# Patient Record
Sex: MALE | Race: WHITE | URBAN - METROPOLITAN AREA
[De-identification: names, ages, dates, MRNs, and addresses within clinical notes are randomized per-mention and may not be internally consistent; named-entity substitution may affect disease eponyms.]

---

## 2023-09-12 PROBLEM — M65.9 TENOSYNOVITIS OF FOOT: Status: ACTIVE | Noted: 2023-09-12

## 2023-09-12 PROBLEM — M24.552 LEFT HIP FLEXOR TIGHTNESS: Status: ACTIVE | Noted: 2023-09-12

## 2023-09-12 PROBLEM — M65.972 TENOSYNOVITIS OF LEFT ANKLE: Status: ACTIVE | Noted: 2023-09-12

## 2023-09-12 PROBLEM — M65.9 TENOSYNOVITIS OF LEFT ANKLE: Status: ACTIVE | Noted: 2023-09-12

## 2023-09-12 PROBLEM — M79.89 SWELLING OF LEFT FOOT: Status: ACTIVE | Noted: 2023-09-12

## 2023-09-12 PROBLEM — M76.71 PERONEAL TENDINITIS OF RIGHT LOWER EXTREMITY: Status: ACTIVE | Noted: 2023-09-12

## 2023-09-12 PROBLEM — Q66.6 VALGUS DEFORMITY OF BOTH FEET: Status: ACTIVE | Noted: 2023-09-12

## 2023-09-12 PROBLEM — M65.979 TENOSYNOVITIS OF FOOT: Status: ACTIVE | Noted: 2023-09-12

## 2023-09-12 PROBLEM — M62.9 HAMSTRING TIGHTNESS OF BOTH LOWER EXTREMITIES: Status: ACTIVE | Noted: 2023-09-12

## 2023-09-12 PROBLEM — M62.89 QUADRICEP TIGHTNESS: Status: ACTIVE | Noted: 2023-09-12

## 2023-09-12 PROBLEM — H52.31 ANISOMETROPIA: Status: ACTIVE | Noted: 2023-09-12

## 2023-09-12 PROBLEM — S83.8X2A INJURY OF MENISCUS OF LEFT KNEE: Status: ACTIVE | Noted: 2023-09-12
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
## 2023-09-12 PROBLEM — M17.12 OSTEOARTHRITIS OF LEFT KNEE: Status: ACTIVE | Noted: 2023-09-12

## 2023-09-12 PROBLEM — S96.911A STRAIN OF RIGHT FOOT: Status: ACTIVE | Noted: 2023-09-12

## 2023-09-12 PROBLEM — M21.072 EVERSION DEFORMITY OF LEFT FOOT: Status: ACTIVE | Noted: 2023-09-12

## 2023-09-12 PROBLEM — M76.52 PATELLAR TENDONITIS OF LEFT KNEE: Status: ACTIVE | Noted: 2023-09-12

## 2023-09-12 PROBLEM — M22.2X2 PATELLOFEMORAL PAIN SYNDROME OF LEFT KNEE: Status: ACTIVE | Noted: 2023-09-12

## 2023-09-12 PROBLEM — H44.20 DEGENERATIVE PROGRESSIVE HIGH MYOPIA: Status: ACTIVE | Noted: 2023-09-12

## 2023-09-12 PROBLEM — M21.70 LEG LENGTH DISCREPANCY: Status: ACTIVE | Noted: 2023-09-12

## 2023-09-12 PROBLEM — M25.372 LEFT ANKLE INSTABILITY: Status: ACTIVE | Noted: 2023-09-12

## 2023-09-12 PROBLEM — S93.492A HIGH ANKLE SPRAIN OF LEFT LOWER EXTREMITY: Status: ACTIVE | Noted: 2023-09-12

## 2023-09-12 PROBLEM — H25.10 NUCLEAR SENILE CATARACT: Status: ACTIVE | Noted: 2023-09-12

## 2023-09-12 PROBLEM — S93.601A SPRAIN OF RIGHT FOOT: Status: ACTIVE | Noted: 2023-09-12

## 2023-09-12 PROBLEM — M25.80 SESAMOIDITIS: Status: ACTIVE | Noted: 2023-09-12

## 2023-09-12 PROBLEM — H43.10 VITREOUS HEMORRHAGE (MULTI): Status: ACTIVE | Noted: 2023-09-12

## 2023-09-12 PROBLEM — S83.419A SPRAIN OF MEDIAL COLLATERAL LIGAMENT OF KNEE: Status: ACTIVE | Noted: 2023-09-12

## 2023-09-12 PROBLEM — H43.399 FLOATERS IN VISUAL FIELD: Status: ACTIVE | Noted: 2023-09-12

## 2023-09-12 PROBLEM — R60.0 EDEMA OF LEFT FOOT: Status: ACTIVE | Noted: 2023-09-12

## 2023-09-12 RX ORDER — IBUPROFEN AND FAMOTIDINE 26.6; 8 MG/1; MG/1
1 TABLET ORAL 3 TIMES DAILY
COMMUNITY

## 2023-10-24 NOTE — PROGRESS NOTES
Verbal consent of the patient and/or verbal parental consent for patients under the age of 18 have been obtained to conduct a physical examination at this office visit.    Established patient  History Of Present Illness  10/25/23 Jules Gill is a 63 y.o. male who presents for reevaluation of his LEFT foot/ankle. He reports approximately x3 weeks ago he filled in for a friend in a bowling league and during the third game he felt as though he strained his left foot/ankle with similar pain as his previous injury. He describes his pain as sharp that increased over the weekend which prompted him to visit the [facility] yesterday. He tried to compensate for the pain by walking on the edge of his foot but then the lateral part of her ankle began to hurt. He says the provider diagnosed him with gout in his ear and sent in a prescription which he says was filled this morning which he called the pharmacist while in the room and a prescription of indomethacin is waiting for him which is what we would prescribe for gout anyways, but he has not been able to pick-up the medication.  He says that we will be able to pick it up after the office visit.  He states pain and swelling has caused him to walk with a antalgic gait. He has been applying ice and taking OTC Ibuprofen for pain management with very little relief. He rates his pain at 7/10 today. He notes the other day he had to sleep with a shoe on as the bed sheet caused increased pain upon touching his foot. He tells that the only other onset of injury that he could think of was when he was walking and hit a transition causing instability while hearing a popping sound. He comes in today with a noticeable limp.  He says he cannot recall any injury whatsoever besides may be aggravating it when he was doing his bowling and based off of his injury there is a possibility because that would be his trailing leg that he was always curling under and because of his  arthritis he could have flared it up and potentially developed some gouty arthritis.  We talked in detail about some things that could flareup of gout we could not pinpoint anything but since he had the history of the diagnosis of the gout in other places I told him that it always could be a possibility.    Last Recorded Vitals  /84   Pulse 82   Ht 1.829 m (6')   Wt 83.9 kg (185 lb)   BMI 25.09 kg/m²      All previous Progress Notes and imaging results related to this patients chief complaint have been reviewed in preparation for this examination.    Past Medical History  He has no past medical history on file.    Surgical History  He has a past surgical history that includes Prostate biopsy (10/20/2023).     Social History  He reports that he has never smoked. He has never used smokeless tobacco. He reports that he does not currently use alcohol. He reports that he does not use drugs.    Family History  Family History   Problem Relation Name Age of Onset    Cancer Father      Diabetes Father          Allergies  Patient has no known allergies.    Historical Clinical Intake  December 8, 2020 ----------------- NICKI  Jeronimo, who has been a patient in this office prior to today, is here due to having pain in his LEFT foot and ankle. The patient states that he works for [employer]. He states that his pain began in April of this year. He states that he was running to his golf cart and had a pop on the outside of the ankle. He states that his ankle has not been the same since that time. He states that he has been trying to do some exercises and using ice and medications to help reduce his pain. He states that his pain is more intense when he is active and has been pretty consistent until this week. He states that last weekend he had rested and his pain had reduced from a 5/10 to a 1/10 for the first time since the initial injury. The patient states that his pain is consistently achy and does become more so when  he is active. The patient denies any numbness, pins and needles sensations, tingling, brusing, or swelling at anytime.  -------------------------------------------  January 27, 2021 Above note reviewed. Patient is here for reevaluation of his left ankle pain. He is presently in PT and performs home exercises with marked progress. He is 0/10 today. He says about one week ago his ankle started feeling better. He states that he no longer feels the ankle instability which he says would happen if he was on unstable ground. Jeronimo discontinued use of the tall XP walking boot which he says provided relief initially and then started causing pain. Patient likes to jog outdoors but hasn't return to this yet. Patient wears shoes with good stability control and is waiting to get his custom insoles from [employer]. We discussed the risk of running on uneven ground reinjuring the ankle. Due to his prolonged ankle pain and instability, I am ordering an MRI to further evalutate the ligament injuries. We discussed regenerative injections pending MRI results. I stressed the importance of wearing an ankle brace when he returns to activity to protect the ankle.  --------------------------------------------  February 16, 2021 Above notes reviewed. Patient is here to follow-up on his LEFT ankle MRI. Jeronimo is performing home exercises with noted improvement. He reports over the weekend he was moving a television stand and experienced a sharp pain to the lateral malleolus at 5/10. He is not currently taking any medications for pain relief. Jeronimo is wearing his custom insoles and shoes with good stability. He is 1/10 today.  --------------------------------------------  April 13, 2021 All notes reviewed. Jeronimo is here for a reevaluation of his LEFT ankle. He continues in physical therapy and performs home stretching/strengthening exercises given to him by PT. He reports remarkable improvement to his left ankle pain but is experiencing  an ache and mild cramping to the sole and lateral aspect of his left foot. Jeronimo takes Ibuprofen and Tylenol for pain with mild relief. He continues to wear custom insoles and shoes with good stability. He is 2/10 today. Recently Jeronimo notes feeling like his sock is rolled up in his shoe and when he checks it is normal. He has been in physcial therapy since his last visit but reports that it has not been bringing him much relief. He was referred back to the office by physical therapy.  --------------------------------------------  April 28, 2021 All notes reviewed. Jeronimo is here for an evaluation of his LEFT foot MRI. He is not currently in physical therapy, he says he was waiting results of MRI before returning to . Jeronimo says that his ankle is feeling better. He reports he continues to experience an ache to the sole and lateral aspect of his left foot. He says the sensation of having a sock rolled up in his shoe has lessened. Jeronimo says he finds it difficult to push off the foot when walking. He is wearing shoes with good stability and custom insoles. He is 2/10 today and takes Duexis for pain with relief. We discussed his MRI in detail. I told him to start wearing a short SP walking boot because Jeronimo says that when he was in the tall XP walking boot previously he had greater foot pain which was most likely due to the excessive weight. I stressed the importance of staying off the foot as much as possible to allow for healing. He is okay to continue exercising such as weight lifting or non impact cardio however he should avoid running and limit walking. Additionally, I recommend he add a metatarsal pad in his custom insoles as well as a 5.5mm heel lift into the left insole. I recommend he continue taking NSAIDs and start taking calcium with vitamin D to support bony healing. He is hoping to be out of the boot by June 19th because his son is getting .  --------------------------------------------  May 26, 2021  All notes reviewed. Jeronimo is here for a reevaluation of his LEFT ankle. He is currently in physical therapy and performs home exercises previously learned in PT. He says he wore the SP short walking boot for 2 weeks after his last office visit. He reports a general ache throughout his left ankle joint especially when walking on uneven ground that waxes and wanes. Jeronimo also notes an ache to the later aspect of his left foot when walking and says he has extended his stride recently. He states a couple of weeks ago he rolled his left ankle when hitting golf balls and put himself back into the short SP walking boot for a couple of days, took Duexis and iced with relief. Jeronimo is wearing custom insoles and shoes with good stability. He is 1/10 today. He reports his pain has improved since returning to physical therapy. We discussed the possibility of regenerative injections in the future. I am going to put Ashish holland a stabilizing ankle brace to see if it helps him improve. I discussed with Jeronimo that if he doesn't start to show improvement by next visit then we may have to put him back into the short SP walking boot.  --------------------------------------------  June 30, 2021 Above notes reviewed. Jeronimo is here for a reevaluation of his LEFT foot/ankle. He is currently in physical therapy and performs home exercises learned in PT. Jeronimo notes that he has a PT session later today. He reports virtually no pain to his left foot. Jeronimo describes his pain as an ache to the lateral aspect of his left foot. He wears a stabilizing ankle brace with activity. He is 0.5/10 today. Jeronimo continues to wear his custom insoles and shoes with good stability. Mr. Gill will be return to playing golf this week  --------------------------------------------  August 11, 2021 All notes reviewed. Jeronimo is here for a reevaluation of his LEFT ankle. He has completed physical therapy and continues to perform home exercises provided to him by PT,  "including aquatic therapy in his home pool. He notes marked improvement since his last evaluation. Jeronimo reports generalized soreness along the lateral aspect of his left foot proximal to his peroneal tendon. He notes wearing his stabalizing ankle brace on occasion when he is walking for extended periods of time. He rates his pain at 1/10 today. Jeronimo continues to wear shoes with good stability and full foot insoles. Discussed possibility in the future of regenerative injections vs cortiscosteroid injections vs combination approach. Reviewed manual massage technique to be performed by the patient routinely.  --------------------------------------------  March 6, 2023 Above notes reviewed. Verbal consent of the patient and/or verbal parental consent for patients under the age of 18 have been obtained to conduct a physical examination at this office visit. Jeronimo is a well established patient who presents with complaints of LEFT foot/ankle pain ongoing x approximately 1 1/2 weeks. He states that he was walking back to his car from a [employer] game approximately 1 1/2 weeks ago and his left foot started hurting. He does not recall tripping or falling on his way back to the car, but thinks that he may have \"rolled\" his left ankle (inversion ORACIO). Positive c/o swelling and pain last Friday. He was unable to FWB on 3/4/23 and was seen Sunday at [facility]. He was given Prednisone taper and Xrays which were negative for fracture. He notes that his pain has improved s/p prednisone taper. Additionally, Jeronimo has been wearing his ankle stablizing brace that he had from a previous injury for support. Pain today is 2/10.  --------------------------------------------  May 1, 2023 Above notes reviewed. Verbal consent of the patient and/or verbal parental consent for patients under the age of 18 have been obtained to conduct a physical examination at this office visit. Jeronimo is a well established patient who presents for " reevaluation of his LEFT foot/ankle. Patient reports pain as 1/10 today described as aching. He was helping with getting his Mother's house ready to put up for sale, thinks he over did it this weekend. Patient says he had to take some Ibuprofen earlier today. He finished up his Physical Therapy and is doing home exercises and notes improvement from the excersises. He is not using any brace for support. When asked where it hurts he points right to the lateral aspect of his left foot in the region of the peroneal tendon. He says that he was doing lots of lifting and pushing off on that foot. I told him it looks like he flared up his peroneal brevis and longus again which he had injured in strain previously. Additionally after looking in his insoles in his shoes I notice that they were set in issues with it being rolled out in more of an in eversion setting. I showed him how to put the insoles in his shoes so there more flat and give him good arch support as well as support the lateral aspect of his foot. We also talked about getting him back into physical therapy.      Review of Systems:  CONSTITUTIONAL:   Negative for weight change, loss of appetite, fatigue, weakness, fever, chills, night sweats, headaches .           HEENT:   Negative for cold, cough, sore throat, sinus pain, swollen lymph nodes.           OPHTHALMOLOGY:   Negative for diminished vision, blurred vision, loss of vision, double vision.           ALLERGY:   Negative for runny nose, scratchy throat, sinus congestion, rash, facial pressure, nasal congestion, post-nasal drip.           CARDIOLOGY:   Negative for chest pain, palpitations, murmurs, irregular heart beat, shortness of breath, leg edema, dyspnea on exertion, fatigue, dizziness.           RESPIRATORY:   Negative for chest pain, shortness of breath, swelling of the legs, asthma/copd, chest congestion, pain with breathing .           GASTROENTEROLOGY:   Negative for nausea, vomitting, heartburn,  constipation, diarrhea, blood in stool, change in bowel habits, black stool.           HEMATOLOGY/LYMPH:   Negative for fatigue, loss of appetitie, easy bruising, easy bleeding, anemia, abnormal bleeding, slow healing.           ENDOCRINOLOGY:   Negative for polyuria, polydipsia, polyphagia, fatigue, weight loss, weight gain, cold intolerance, heat intolerance, diabetes.           MUSCULOSKELETAL:   Positive  for LEFT foot/ankle pain        DERMATOLOGY:   Negative for rash, bruising.           NEUROLOGY:   Negative for tingling, numbness, gait abnormality, paresthesias, weakness, sciatica.        General Examination:     GENERAL APPEARANCE: appears well, pleasant, and cooperative, NAD , alert and oriented x 3, Pleasant and cooperative, No Acute Distress.   HEENT: normal, unremarkable , EOMI, PERRLA, tympanic membranes clear and flat bilaterally, nose clear, Oropharynx clear with MMM.      NECK: supple , no lymphadenopathy, no thyromegaly, supple, no carotid bruit.   HEART: RRR, normal S1S2 , no murmurs, click or rubs, regular rate and rhythm, normal S1S2.   LUNGS: clear to auscultation bilaterally , clear to auscultation bilaterally, no wheezes/rhonchi/rales.   CHEST: no tenderness on chest wall.   ABDOMEN: soft, NT/ND, BS present , soft, NT/ND, BS present, no guarding or rigidity, no masses palpated, no hepatosplenomegaly.          EXTREMITIES: no cyanosis, clubbing , no edema, pulses 2 plus bilaterally, no clubbing.   SKIN: no rash or cellulitis , normal, no rash.   PERIPHERAL PULSES: normal (2+) bilaterally.   NEUROLOGIC EXAM: awake, A&O x 3, CN's II-XII grossly intact.   MUSCULOSKELETAL: no gross abnormalities.   PSYCH: good eye contact, appropriate mood and affect .     General (LOWER LEG/ANKLE/FEET):  Location:  LEFT, Lateral pain.   Erythema: Negative.   Edema: Positive laterally  Effusion: Negative.   Warmth: Negative.   Ecchymosis/Bruising: Negative.   Percussion Test: Negative.   Tuning Fork Test:  Negative.   Abrasions: Negative.   Orientation: Asymmetrical because of the swelling  ROM: Positive decreased plantarflexion and dorsiflexion as well as inversion and eversion because of swelling and pain  Muscle Strength: Positive  +4-+5/+5 Dorsiflexion and Eversion, causes pain over peroneal tendon.   DTR/Neurological: +2/+4.   Palpation: Positive: Tender to palpation along the ATF,  CF,  and PTF ligaments as well as the distal fibula   Vascular: +2/+4 , Capillary Refill < 2 Seconds.               LEFT leg shorter than the RIGHT leg by the following:  Iliac Crest 5.4 mm  Sacral Base 5.5 mm  Femoral heads 5.3 mm  Median difference of the LEFT leg shorter than the RIGHT is 5.4 mm                    PROCEDURE: PELVIS 1 OR 2 VIEW - TXR [id number]  REASON FOR EXAM: LEG LENGTH DISCREPANCY  RESULT: EXAM: AP pelvis, one view, weightbearing  CLINICAL HISTORY: Leg length discrepancy            FINDINGS: No fractures or destructive lesions are identified. The pelvic ring is intact. Hip joint spaces are maintained in width. There is elevation the right femoral head compared to the left by distance of 5 mm.                    IMPRESSION PELVIS XR:  Right femoral head is positioned 5 mm cephalad compared to the left femoral head.                    This report has been produced using speech recognition.  Original Interpreting Physician: NATHAN WRIGHT M.D.  Original Transcribed by/Date: [facility] Apr 28 2021 9:28A  ----------------------------------------------------  PROCEDURE: FOOT LT WO - BMR [id number]  REASON FOR EXAM: LEFT FOOT PAIN  RESULT: FOOT LT WO: 4/20/2021 4:34 PM  CLINICAL HISTORY: Foot pain persistent ligamentous sprain tenosynovitis stress reaction  COMPARISON: 4/14/2021                    TECHNIQUE: Multiplanar and multisequence MR images were obtained of the left foot.                    FINDINGS:   Deep the level skin marker about the mid forefoot visualized central cord of the plantar fascia as well as portions the visualized  digital branches extending distally demonstrate normal signal intensity. No intermediate signal intensity or thickening of the central cord. The flexor digitorum brevis musculature is unremarkable. However, the flexor digitorum longus tendons and tendon sheath about the level the midfoot deep to the previous described musculature demonstrates mild fluid within the tendon sheath with mild tenosynovitis about the flexor tendons (series 4 image 20-26). Minimal edema seen tracking the adjacent interfascial plane. The flexor digitorum longus tendons extending distally are intact. Midfoot osseous structures demonstrate normal marrow signal intensity. No subcortical cystic change or reactive marrow edema. No osteoarthritic degenerative change about the intertarsal articulations. Lisfranc joint is congruent. Tarsometatarsal articulations are unremarkable. Lisfranc ligament complex is unremarkable.            Forefoot metatarsal shafts demonstrate minimal marrow edema within the fourth metatarsal base near the plantar margin (series 9 image 23-24 and series 4 image 17). There is minimal surrounding pericapsular edema about the adjacent fourth tarsometatarsal articulation with mild edema tracking the interfascial plane about the plantar lateral margin of the fourth metatarsal shaft. No definite ligamentous sprain with component of findings likely reactive from mild stress reaction. Tibialis anterior tendon is intact. Extensor hallucis longus and extensor digitorum tendons are unremarkable. Flexor hallucis longus demonstrates mild fluid about the master knot of Dylan. The distal tendon is unremarkable. Remainder the visualized musculature about the foot demonstrates minimal edema within the plantar interossei muscle about the fourth/fifth metatarsal bases visualized portions plantar fascia are unremarkable. First MTP joint demonstrates physiologic amount of fluid. No joint effusion. No periarticular marrow edema demonstrated.  No osteoarthritic degenerative change. Sesamoid bones first MTP joint demonstrate mild intermediate signal intensity in the fibular sesamoid outer margin. Intersesamoid ligament and visualized plantar plate are intact. Second through fifth MTP joints are unremarkable. No joint effusions or periarticular marrow edema. No intermetatarsal bursitis. No soft tissue fullness plantar margin metatarsal hasn't distribution the digital nerve to suggest neuroma formation. Mild subcutaneous edema dorsum of foot.    IMPRESSION LEFT FOOT MRI 04/21/2021:  1. Mild tenosynovitis about the flexor digitorum longus tendon within the midfoot deep to the level skin marker. No tendinitis or focal tendon tear.  2. Minimal stress reaction fourth metatarsal base with mild adjacent pericapsular edema. However, no capsulitis demonstrated. Minimal edema and strain within the adjacent plantar interossei muscle.  3. Minimal edema within the plantar interossei muscle about the fourth/fifth metatarsal bases  4. Mild sesmoiditis    This report has been produced using speech recognition.  Original Interpreting Physician: ARYA CURRIE MD  Original Transcribed by/Date: [facility] Apr 21 2021 8:17A  ----------------------------------------------------  PROCEDURE: FOOT LT MIN 3 VIEW - TXR [id number]  REASON FOR EXAM: SPRAIN OF OTHER LIGAMENT OF LEFT ANKLE,SUBSEQUENT ENCOUNTER  RESULT: EXAM: Left foot three views weightbearing  CLINICAL HISTORY: Lateral pain from injury one year ago.                    FINDINGS: No fractures or destructive lesions are identified.                    IMPRESSION LEFT FOOT XR 04/14/2021:  No acute pathologic findings are identified.                    This report has been produced using speech recognition.  Original Interpreting Physician: NATHAN WRIGHT M.D.  Original Transcribed by/Date: [facility] Apr 14 2021 2:20P  ----------------------------------------------------  PROCEDURE: ANKLE LT WO - BMR [id number]  REASON FOR EXAM: PAIN IN LEFT  ANKLE  RESULT: ANKLE LT WO: 2/3/2021 5:09 PM  CLINICAL HISTORY: Ankle pain sprain ankle instability  COMPARISON: 12/8/2020                    TECHNIQUE: Multiplanar and multisequence MR images were obtained of the left ankle.                    FINDINGS:  Anterior tibiofibular ligament is unremarkable. Posterior tibiofibular ligament is unremarkable. Distal interosseous tibiofibular ligament is unremarkable.                    Anterior talofibular ligament is unremarkable. No reactive marrow edema. No cortical avulsion fragment. Calcaneofibular ligament demonstrates periligamentous edema about the tip the lateral malleolus with sequela of low-grade sprain suggested. No cortical avulsion fragment noted. Posterior talofibular ligament is unremarkable.                    Posterior tibiotalar portion deep deltoid ligament demonstrates loss of normal striated configuration the ligament with sequela chronic low-grade sprain. No periligamentous edema demonstrated. Visualized components superficial deltoid ligament are unremarkable. Spring ligament complex is unremarkable.                    Sinus Tarsi is unremarkable. Subtalar joint is unremarkable. Ankle joint is unremarkable. There is no effusion. No cartilage defect demonstrated.                     Posterior tibial tendon demonstrates mild physiologic fluid about the tendon sheath in the groove the medial malleolus. No surrounding soft tissue edema, but mild tenosynovitis not excluded. Also, mild fluid within the flexor digitorum and flexor hallucis longus tendon sheaths extending from the master knot of Dylan proximally which is physiologic. No surrounding edema. Tarsal tunnel demonstrates unremarkable medial lateral plantar nerves.                    Peroneus brevis tendon tendon is unremarkable. Tendon is intact to the base the fifth metatarsal. No focal tendon tear. Peroneus longus tendon demonstrates mild tendinosis with low-grade partial superficial tear about  the peroneal tubercle with slight distal extension. Superimpose component of mild tendinitis in this area of concern. No definite split tear                    Tibialis anterior and extensor tendons are unremarkable.                    Achilles tendon demonstrates mild tendinosis distally with mild intermediate signal intensity. No tendinitis or bursitis. Plantar fascia demonstrates no thickened central or lateral cords. Minimal edema in the calcaneal heel pad. Musculature and hindfoot demonstrates no asymmetric atrophy or edema. Midfoot osseous structures demonstrate normal marrow signal intensity. Included portions visualized metatarsal shafts are unremarkable.                    IMPRESSION LEFT ANKLE MRI 02/04/2021:  1. Mild peroneus longus tendinosis distally with low-grade partial superficial tear about the peroneal tubercle extending to near the cuboid groove over a distance of 1.8 cm. Superimposed component of tendinitis in this area as not excluded with characterization limited magic angle artifact. No tenosynovitis demonstrated. Correlate point tenderness. No split tear noted.  2. Low-grade sprain of the calcaneofibular ligament about the fibular attachment  3. Physiologic amount of fluid about the flexor tendons with component of mild tenosynovitis about the posterior tibial felt to be less likely.  4. Chronic low-grade sprain of the deep deltoid ligament.                    This report has been produced using speech recognition.  Original Interpreting Physician: ARYA CURRIE MD  Original Transcribed by/Date: [facility] Feb 4 2021 9:08A  ----------------------------------------------------  PROCEDURE: ANKLE LT MIN 3 VIEW - [id number]  REASON FOR EXAM: LEFT ANKLE PAIN  RESULT: ANKLE LT MIN 3 VIEW: 12/8/2020 9:17 AM  CLINICAL HISTORY: Left ankle pain inversion injury  COMPARISON: None.                    TECHNIQUE: Four views of the Left ankle were performed.                    FINDINGS:               Ankle  mortise is intact. No fracture or dislocation. No ankle joint effusion identified. No osteoarthritic degenerative change. No osteochondral lesion within the talar dome.There is dorsal osteophytosis at the talonavicular articulation with sequela remote capsular sprain suggested. Minimal calcaneal spurring plantar fascia origin.                    IMPRESSION LEFT ANKLE XR12/08/2020:  1. Unremarkable left ankle joint. No osteochondral lesion or ankle joint effusion.  2. Minimal calcaneal spurring plantar fascia origin.                  This report has been produced using speech recognition.  Original Interpreting Physician: ARYA CURRIE MD  [facility] 9[facility]42[facility]      Function Tests:  Grifka Test: Negative.   Strunsky Test: Negative.   Toe Displacement Test: Negative.   Crepitation Test: Negative.   Metatarsal Tap Test: Negative.   Drake Compression Test (Calf Compression Test): Negative.   Achilles Tendon Tap Test: Negative.   Collateral and Syndesmosis Ligaments: Negative.   Talar Tilt Test 2(Eversion Stress Test/Valgus Stress Test: Negative.   Anterior Drawer Test: Negative.   Squeeze Test: Negative.   Dorsiflexion Test: Negative.   Plantarflexion Test: Negative.   Heel Thump Test: Negative.   Posterior Ankle Impingement Test-Hyperplantar Flexion Test: Negative.   Anterior Ankle Impingement Test-Hyperdorsiflexion Test: Negative.   Antwon Click Test (Lake Test): Negative.   Heel Compression Test: Negative.   Test for Rigid or Supple Flat Feet: Negative.   Tibial Torsion Test: Negative.   Tonya's Sign: Negative.   Sitting Gastrocnemius Test: Negative.   Sitting Soleus Test: Negative.   Lying Gastrocnemius Test: Negative.   Lying Soleus Test: Negative.   Standing/Walking Test: Negative.   Horizontal Squeeze Test:  Negative.   Vertical Squeeze Test:  Negative.   Hop Test: Negative.     Assessment   1. Ankle instability, left        2. Left foot pain  Referral to Physical Therapy     XR ankle left 3+ views      3. Left peroneal tendinosis  Referral to Physical Therapy    XR ankle left 3+ views      4. Tenosynovitis of left foot  Referral to Physical Therapy    XR ankle left 3+ views      5. Stress reaction of left foot with routine healing, subsequent encounter  Referral to Physical Therapy    XR ankle left 3+ views      6. Sesamoiditis of left foot  Referral to Physical Therapy    XR ankle left 3+ views      7. Edema of left foot  Referral to Physical Therapy    XR ankle left 3+ views      8. Lake's neuroma of left foot  Referral to Physical Therapy    XR ankle left 3+ views      9. Metatarsalgia of left foot  Referral to Physical Therapy    XR ankle left 3+ views      10. Acute left ankle pain  Referral to Physical Therapy    XR ankle left 3+ views      11. Sprain of other ligament of left ankle, subsequent encounter  Referral to Physical Therapy    XR ankle left 3+ views      12. Tenosynovitis of left ankle  Referral to Physical Therapy    XR ankle left 3+ views      13. High ankle sprain of left lower extremity, subsequent encounter  Referral to Physical Therapy    XR ankle left 3+ views      14. Left ankle instability  Referral to Physical Therapy    XR ankle left 3+ views      15. 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  Referral to Physical Therapy    XR ankle left 3+ views          Treatment or Intervention:    May continue to use PRICE therapy as needed   Patient told to Wear Tall XP Walking Boot their LEFT ankle injury/condition which she has at home  Start back into  Physical Therapy 1-2 times a week for 8-10 weeks with manual therapy as well as dry needling and IASTM until complete   Reviewed home exercises to be performed by the patient routinely, including stretching exercises, Reviewed ice bucket icing and exercises to be performed  while icing a minimum of twice daily  Stressed the importance of wearing shoes with good stability control to help with the biomechanics affecting the lower legs  Stressed the importance of wearing full foot insoles to help with the biomechanics affecting the lower legs   Recommendation to start taking again over-the-counter calcium with vitamin-D 2 -3000+ milligrams a day, as well as OTC symphytum as directed daily to promote bony healing, in addition to a daily multivitamin.   Recommendation over-the-counter curcumin, turmeric, boswellia, as well as egg shell membrane as directed to aid with joint inflammation.   Recommendation to continue over-the-counter Move Free for joint health.,  May take OTC Tylenol Extra Strength or OTC Tylenol Arthritis, taking one every 6-8 hours with food as needed for pain management.,  Patient advised regarding the risks and/or potential adverse reactions and/or side effects of any prescribed medications along with any over-the-counter medications or any supplements used. Patient advised to seek immediate medical care if any adverse reactions occur. The patient and/or patient(s) parent(s) verbalized their understanding.   Discussed in detail with the patient to the level of their understanding the possibility in the future of LEFT ankle to rule out ligament or tendon injury versus stress fracture versus other   Discussed in detail with the patient to the level of their understanding the possibility in the future of regenerative injections versus corticosteroid injections   Recommended to wear compression socks to reduce swelling.    prescription that was called into the pharmacy already from the urgent care for indomethacin 3 times a day and take with food for 5 days and he can alternate it with Tylenol with food and stressed to him the importance of, not taking any NSAIDs such as Advil or Ibuprofen.    Follow up 2 weeks or sooner if needed.     Diagnostic studies:  No  additional imaging or laboratory studies are needed at this time.    Activity Instructions, Restrictions, and Accommodations:  The family has been provided a note (after visit summary) outlining all current activity instructions, restrictions, and accommodations.    Consultations/Referrals:  Physical therapy    Follow-up   Follow up in 2 weeks or sooner if needed. EYAD, Alise Bojorquez, attest this documentation has been prepared under the direction and in the presence of Rosangela Crowe D.O. By signing below, IArline D.O, personally performed the services described in this documentation. All medical record entries made by the scribe were at my direction and in my presence. I have reviewed the chart and agree that the record reflects my personal performance and is accurate and complete. Please note that this report has been partially produced using speech recognition software. It may contain errors related to grammar, punctuation or spelling. Electronically signed, but not reviewed. Arline Crowe D.O. Director of Sports Medicine.     ARLINE CROWE on 10/26/23 at 9:45 AM.     Arline Crowe DO, FAOASM

## 2023-10-25 ENCOUNTER — ANCILLARY PROCEDURE (OUTPATIENT)
Dept: RADIOLOGY | Facility: CLINIC | Age: 63
End: 2023-10-25
Payer: MEDICARE

## 2023-10-25 ENCOUNTER — OFFICE VISIT (OUTPATIENT)
Dept: SPORTS MEDICINE | Facility: CLINIC | Age: 63
End: 2023-10-25
Payer: MEDICARE

## 2023-10-25 VITALS
SYSTOLIC BLOOD PRESSURE: 128 MMHG | WEIGHT: 185 LBS | DIASTOLIC BLOOD PRESSURE: 84 MMHG | HEIGHT: 72 IN | HEART RATE: 82 BPM | BODY MASS INDEX: 25.06 KG/M2

## 2023-10-25 DIAGNOSIS — S93.492D SPRAIN OF OTHER LIGAMENT OF LEFT ANKLE, SUBSEQUENT ENCOUNTER: ICD-10-CM

## 2023-10-25 DIAGNOSIS — M65.9 TENOSYNOVITIS OF LEFT ANKLE: ICD-10-CM

## 2023-10-25 DIAGNOSIS — M25.572 ACUTE LEFT ANKLE PAIN: ICD-10-CM

## 2023-10-25 DIAGNOSIS — M84.375D STRESS REACTION OF LEFT FOOT WITH ROUTINE HEALING, SUBSEQUENT ENCOUNTER: ICD-10-CM

## 2023-10-25 DIAGNOSIS — M10.9 GOUTY ARTHRITIS OF LEFT ANKLE: ICD-10-CM

## 2023-10-25 DIAGNOSIS — M21.70 LEG LENGTH DISCREPANCY: ICD-10-CM

## 2023-10-25 DIAGNOSIS — S93.492D HIGH ANKLE SPRAIN OF LEFT LOWER EXTREMITY, SUBSEQUENT ENCOUNTER: ICD-10-CM

## 2023-10-25 DIAGNOSIS — M79.672 LEFT FOOT PAIN: ICD-10-CM

## 2023-10-25 DIAGNOSIS — M77.42 METATARSALGIA OF LEFT FOOT: ICD-10-CM

## 2023-10-25 DIAGNOSIS — R60.0 EDEMA OF LEFT FOOT: ICD-10-CM

## 2023-10-25 DIAGNOSIS — M21.072 EVERSION DEFORMITY OF LEFT FOOT: ICD-10-CM

## 2023-10-25 DIAGNOSIS — M25.872 SESAMOIDITIS OF LEFT FOOT: ICD-10-CM

## 2023-10-25 DIAGNOSIS — G57.62 MORTON'S NEUROMA OF LEFT FOOT: ICD-10-CM

## 2023-10-25 DIAGNOSIS — M25.372 LEFT ANKLE INSTABILITY: ICD-10-CM

## 2023-10-25 DIAGNOSIS — M65.9 TENOSYNOVITIS OF LEFT FOOT: ICD-10-CM

## 2023-10-25 DIAGNOSIS — M25.372 ANKLE INSTABILITY, LEFT: Primary | ICD-10-CM

## 2023-10-25 DIAGNOSIS — M67.88 LEFT PERONEAL TENDINOSIS: ICD-10-CM

## 2023-10-25 PROCEDURE — 73610 X-RAY EXAM OF ANKLE: CPT | Mod: LT,FY

## 2023-10-25 PROCEDURE — 1125F AMNT PAIN NOTED PAIN PRSNT: CPT | Performed by: FAMILY MEDICINE

## 2023-10-25 PROCEDURE — 99214 OFFICE O/P EST MOD 30 MIN: CPT | Performed by: FAMILY MEDICINE

## 2023-10-25 PROCEDURE — 1036F TOBACCO NON-USER: CPT | Performed by: FAMILY MEDICINE

## 2023-10-25 PROCEDURE — 1159F MED LIST DOCD IN RCRD: CPT | Performed by: FAMILY MEDICINE

## 2023-10-25 RX ORDER — IBUPROFEN 200 MG
200 TABLET ORAL EVERY 6 HOURS PRN
COMMUNITY

## 2023-10-25 ASSESSMENT — PAIN - FUNCTIONAL ASSESSMENT: PAIN_FUNCTIONAL_ASSESSMENT: 0-10

## 2023-10-25 ASSESSMENT — PAIN SCALES - GENERAL: PAINLEVEL_OUTOF10: 7

## 2023-10-25 ASSESSMENT — PAIN DESCRIPTION - DESCRIPTORS: DESCRIPTORS: SHARP

## 2023-10-25 NOTE — PATIENT INSTRUCTIONS
May continue to use PRICE therapy as needed   Wear Tall XP Walking Boot their LEFT ankle injury/condition   Start back into  Physical Therapy 1-2 times a week for 8-10 weeks with manual therapy as well as dry needling and IASTM until complete   Reviewed home exercises to be performed by the patient routinely, including stretching exercises, Reviewed ice bucket icing and exercises to be performed while icing a minimum of twice daily  Stressed the importance of wearing shoes with good stability control to help with the biomechanics affecting the lower legs  Stressed the importance of wearing full foot insoles to help with the biomechanics affecting the lower legs   Recommendation to continue taking over-the-counter calcium with vitamin-D 2 -3000+ milligrams a day, as well as OTC symphytum as directed daily to promote bony healing, in addition to a daily multivitamin.   Recommendation over-the-counter curcumin, turmeric, boswellia, as well as egg shell membrane as directed to aid with joint inflammation.   Recommendation over-the-counter Move Free for joint health.,  May take OTC Tylenol Extra Strength or OTC Tylenol Arthritis, taking one every 6-8 hours with food as needed for pain management.,  Patient advised regarding the risks and/or potential adverse reactions and/or side effects of any prescribed medications along with any over-the-counter medications or any supplements used. Patient advised to seek immediate medical care if any adverse reactions occur. The patient and/or patient(s) parent(s) verbalized their understanding.   Discussed in detail with the patient to the level of their understanding the possibility in the future of LEFT ankle to rule out ligament or tendon injury versus stress fracture versus other   Discussed in detail with the patient to the level of their understanding the possibility in the future of regenerative injections versus corticosteroid injections   Recommended to wear compression  socks to reduce swelling.    prescription Indomethacin 3 times a day and take with food, do not take Advil or Ibuprofen.    Follow up 2 weeks or sooner if needed.

## 2023-10-26 ENCOUNTER — TELEPHONE (OUTPATIENT)
Dept: OPHTHALMOLOGY | Facility: CLINIC | Age: 63
End: 2023-10-26

## 2023-10-26 PROBLEM — S93.402A SPRAIN OF LEFT ANKLE: Status: ACTIVE | Noted: 2023-10-26

## 2023-10-26 NOTE — TELEPHONE ENCOUNTER
Spoke with patient 2 boxes per eye soft contact lens (SCL) Proclear 8.6BC -10.50 right eye (OD) -12.50 left eye (OS) in office and available for . Patient thanked me.

## 2023-11-06 PROBLEM — G57.62 MORTON'S NEUROMA OF LEFT FOOT: Status: ACTIVE | Noted: 2023-11-06

## 2023-11-06 PROBLEM — M84.375D: Status: ACTIVE | Noted: 2023-11-06

## 2023-11-06 PROBLEM — M67.88 LEFT PERONEAL TENDINOSIS: Status: ACTIVE | Noted: 2023-09-12

## 2023-11-06 PROBLEM — M65.972 TENOSYNOVITIS OF LEFT FOOT: Status: ACTIVE | Noted: 2023-09-12

## 2023-11-06 PROBLEM — M77.42 METATARSALGIA OF LEFT FOOT: Status: ACTIVE | Noted: 2023-11-06

## 2023-11-06 PROBLEM — M10.9: Status: ACTIVE | Noted: 2023-11-06

## 2023-11-06 PROBLEM — M25.572 ACUTE LEFT ANKLE PAIN: Status: ACTIVE | Noted: 2023-11-06

## 2023-11-06 PROBLEM — M79.672 LEFT FOOT PAIN: Status: ACTIVE | Noted: 2023-11-06

## 2023-11-06 NOTE — PROGRESS NOTES
Verbal consent of the patient and/or verbal parental consent for patients under the age of 18 have been obtained to conduct a physical examination at this office visit.    Established patient  History Of Present Illness  11/09/23 Jeronimo Gill is a 63 y.o. male who presents for a follow up of his LEFT ankle. Pt reports improvement to swelling of left ankle but continues to have pain to lateral LEFT ankle 3/10 today. Denies any pain with eversion or inversion but discomfort with flexion/extension. States he continues to have lack of ROM. Pt states pain is worst at the end of the day after walking in which he treats with Tylenol, ibuprofen, ice and elevation. He states that he has been performing home exercises as given to him by physical therapy. Patient is s/p short XP walking boot x 1 week. Pt states he is having prostatectomy surgery 11/20/23 and needs to stop taking the NSAIDS in preparation for his procedure. Took indomethacin and finished it 5 days ago, and the swelling went down but he is still in pain.  I discussed with him in detail about getting into physical therapy even if he is only able to get 1-2 sessions then before his surgery so he could at least be on their schedule for whenever he is ready to come back.  We talked about in the future possibly getting an MRI of his ankle and/or foot however we will going to hold off on it at this time.  Also stressed the importance of him staying off the NSAIDs and only using Tylenol for his pain however we are going to put him on some prednisone to see if we can help calm stuff down even more for him.  We talked about him weaning himself out of his walking boot into one of his ankle braces after he has his prostate surgery done and then we will follow-up afterwards.    Last Recorded Vitals  BP 92/64   Pulse 84   Ht 1.829 m (6')   Wt 83.9 kg (185 lb)   BMI 25.09 kg/m²      All previous Progress Notes and imaging results related to this patients chief complaint  "have been reviewed in preparation for this examination.    Past Medical History  He has no past medical history on file.    Surgical History  He has a past surgical history that includes Prostate biopsy (10/20/2023).     Social History  He reports that he has never smoked. He has never used smokeless tobacco. He reports that he does not currently use alcohol. He reports that he does not use drugs.    Family History  Family History   Problem Relation Name Age of Onset    Cancer Father      Diabetes Father          Allergies  Patient has no known allergies.    Historical Clinical Intake  March 6, 2023 Above notes reviewed. Verbal consent of the patient and/or verbal parental consent for patients under the age of 18 have been obtained to conduct a physical examination at this office visit. Jeronimo is a well established patient who presents with complaints of LEFT foot/ankle pain ongoing x approximately 1 1/2 weeks. He states that he was walking back to his car from a [employer] game approximately 1 1/2 weeks ago and his left foot started hurting. He does not recall tripping or falling on his way back to the car, but thinks that he may have \"rolled\" his left ankle (inversion ORACIO). Positive c/o swelling and pain last Friday. He was unable to FWB on 3/4/23 and was seen Sunday at [facility]. He was given Prednisone taper and Xrays which were negative for fracture. He notes that his pain has improved s/p prednisone taper. Additionally, Jeronimo has been wearing his ankle stablizing brace that he had from a previous injury for support. Pain today is 2/10.  --------------------------------------------  May 1, 2023 Above notes reviewed. Verbal consent of the patient and/or verbal parental consent for patients under the age of 18 have been obtained to conduct a physical examination at this office visit. Jeronimo is a well established patient who presents for reevaluation of his LEFT foot/ankle. Patient reports pain as 1/10 today " described as aching. He was helping with getting his Mother's house ready to put up for sale, thinks he over did it this weekend. Patient says he had to take some Ibuprofen earlier today. He finished up his Physical Therapy and is doing home exercises and notes improvement from the excersises. He is not using any brace for support. When asked where it hurts he points right to the lateral aspect of his left foot in the region of the peroneal tendon. He says that he was doing lots of lifting and pushing off on that foot. I told him it looks like he flared up his peroneal brevis and longus again which he had injured in strain previously. Additionally after looking in his insoles in his shoes I notice that they were set in issues with it being rolled out in more of an in eversion setting. I showed him how to put the insoles in his shoes so there more flat and give him good arch support as well as support the lateral aspect of his foot. We also talked about getting him back into physical therapy.  --------------------------------------------  10/25/23 Jules Gill is a 63 y.o. male who presents for reevaluation of his LEFT foot/ankle. 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 He describes his pain as sharp that increased over the weekend which prompted him to visit the [facility] yesterday. He tried to compensate for the pain by walking on the edge of his foot but then the lateral part of her ankle began to hurt. He says the provider diagnosed him with gout in his ear and sent in a prescription which he says was filled this morning which he called the pharmacist while in the room and a prescription of indomethacin is waiting for him which is what we would prescribe for gout anyways, but he has not been able to pick-up the medication.  He says that  we will be able to pick it up after the office visit.  He states pain and swelling has caused him to walk with a antalgic gait. He has been applying ice and taking OTC Ibuprofen for pain management with very little relief. He rates his pain at 7/10 today. He notes the other day he had to sleep with a shoe on as the bed sheet caused increased pain upon touching his foot. He tells that the only other onset of injury that he could think of was when he was walking and hit a transition causing instability while hearing a popping sound. He comes in today with a noticeable limp.  He says he cannot recall any injury whatsoever besides may be aggravating it when he was doing his bowling and based off of his injury there is a possibility because that would be his trailing leg that he was always curling under and because of his arthritis he could have flared it up and potentially developed some gouty arthritis.  We talked in detail about some things that could flareup of gout we could not pinpoint anything but since he had the history of the diagnosis of the gout in other places I told him that it always could be a possibility.     Review of Systems:  CONSTITUTIONAL:   Negative for weight change, loss of appetite, fatigue, weakness, fever, chills, night sweats, headaches .           HEENT:   Negative for cold, cough, sore throat, sinus pain, swollen lymph nodes.           OPHTHALMOLOGY:   Negative for diminished vision, blurred vision, loss of vision, double vision.           ALLERGY:   Negative for runny nose, scratchy throat, sinus congestion, rash, facial pressure, nasal congestion, post-nasal drip.           CARDIOLOGY:   Negative for chest pain, palpitations, murmurs, irregular heart beat, shortness of breath, leg edema, dyspnea on exertion, fatigue, dizziness.           RESPIRATORY:   Negative for chest pain, shortness of breath, swelling of the legs, asthma/copd, chest congestion, pain with breathing .            GASTROENTEROLOGY:   Negative for nausea, vomitting, heartburn, constipation, diarrhea, blood in stool, change in bowel habits, black stool.           HEMATOLOGY/LYMPH:   Negative for fatigue, loss of appetitie, easy bruising, easy bleeding, anemia, abnormal bleeding, slow healing.           ENDOCRINOLOGY:   Negative for polyuria, polydipsia, polyphagia, fatigue, weight loss, weight gain, cold intolerance, heat intolerance, diabetes.           MUSCULOSKELETAL:   Positive  for LEFT foot/ankle pain        DERMATOLOGY:   Negative for rash, bruising.           NEUROLOGY:   Negative for tingling, numbness, gait abnormality, paresthesias, weakness, sciatica.         General Examination:     GENERAL APPEARANCE: appears well, pleasant, and cooperative, NAD , alert and oriented x 3, Pleasant and cooperative, No Acute Distress.   HEENT: normal, unremarkable , EOMI, PERRLA, tympanic membranes clear and flat bilaterally, nose clear, Oropharynx clear with MMM.      NECK: supple , no lymphadenopathy, no thyromegaly, supple, no carotid bruit.   HEART: RRR, normal S1S2 , no murmurs, click or rubs, regular rate and rhythm, normal S1S2.   LUNGS: clear to auscultation bilaterally , clear to auscultation bilaterally, no wheezes/rhonchi/rales.   CHEST: no tenderness on chest wall.   ABDOMEN: soft, NT/ND, BS present , soft, NT/ND, BS present, no guarding or rigidity, no masses palpated, no hepatosplenomegaly.          EXTREMITIES: no cyanosis, clubbing , no edema, pulses 2 plus bilaterally, no clubbing.   SKIN: no rash or cellulitis , normal, no rash.   PERIPHERAL PULSES: normal (2+) bilaterally.   NEUROLOGIC EXAM: awake, A&O x 3, CN's II-XII grossly intact.   MUSCULOSKELETAL: no gross abnormalities.   PSYCH: good eye contact, appropriate mood and affect .      General (LOWER LEG/ANKLE/FEET):  Location:  LEFT, Lateral pain.   Erythema: Negative.   Edema: Positive laterally  Effusion: Negative.   Warmth: Negative.   Ecchymosis/Bruising:  Negative.   Percussion Test: Negative.   Tuning Fork Test: Negative.   Abrasions: Negative.   Orientation: Asymmetrical because of the swelling  ROM: Positive decreased plantarflexion and dorsiflexion as well as inversion and eversion because of swelling and pain  Muscle Strength: Positive  +4-+5/+5 Dorsiflexion and Eversion, causes pain over peroneal tendon.   DTR/Neurological: +2/+4.   Palpation: Positive: Tender to palpation along Distal fibula, ATF, CF, peroneal tendon  Vascular: +2/+4 , Capillary Refill < 2 Seconds.                LEFT leg shorter than the RIGHT leg by the following:  Iliac Crest 5.4 mm  Sacral Base 5.5 mm  Femoral heads 5.3 mm  Median difference of the LEFT leg shorter than the RIGHT is 5.4 mm                    PROCEDURE: PELVIS 1 OR 2 VIEW - TXR [id number]  REASON FOR EXAM: LEG LENGTH DISCREPANCY  RESULT: EXAM: AP pelvis, one view, weightbearing  CLINICAL HISTORY: Leg length discrepancy            FINDINGS: No fractures or destructive lesions are identified. The pelvic ring is intact. Hip joint spaces are maintained in width. There is elevation the right femoral head compared to the left by distance of 5 mm.                    IMPRESSION PELVIS XR:  Right femoral head is positioned 5 mm cephalad compared to the left femoral head.                    This report has been produced using speech recognition.  Original Interpreting Physician: NATHAN WRIGHT M.D.  Original Transcribed by/Date: [facility] Apr 28 2021 9:28A  ----------------------------------------------------  PROCEDURE: FOOT LT WO - BMR [id number]  REASON FOR EXAM: LEFT FOOT PAIN  RESULT: FOOT LT WO: 4/20/2021 4:34 PM  CLINICAL HISTORY: Foot pain persistent ligamentous sprain tenosynovitis stress reaction  COMPARISON: 4/14/2021                    TECHNIQUE: Multiplanar and multisequence MR images were obtained of the left foot.                    FINDINGS:   Deep the level skin marker about the mid forefoot visualized central cord of the plantar  fascia as well as portions the visualized digital branches extending distally demonstrate normal signal intensity. No intermediate signal intensity or thickening of the central cord. The flexor digitorum brevis musculature is unremarkable. However, the flexor digitorum longus tendons and tendon sheath about the level the midfoot deep to the previous described musculature demonstrates mild fluid within the tendon sheath with mild tenosynovitis about the flexor tendons (series 4 image 20-26). Minimal edema seen tracking the adjacent interfascial plane. The flexor digitorum longus tendons extending distally are intact. Midfoot osseous structures demonstrate normal marrow signal intensity. No subcortical cystic change or reactive marrow edema. No osteoarthritic degenerative change about the intertarsal articulations. Lisfranc joint is congruent. Tarsometatarsal articulations are unremarkable. Lisfranc ligament complex is unremarkable.            Forefoot metatarsal shafts demonstrate minimal marrow edema within the fourth metatarsal base near the plantar margin (series 9 image 23-24 and series 4 image 17). There is minimal surrounding pericapsular edema about the adjacent fourth tarsometatarsal articulation with mild edema tracking the interfascial plane about the plantar lateral margin of the fourth metatarsal shaft. No definite ligamentous sprain with component of findings likely reactive from mild stress reaction. Tibialis anterior tendon is intact. Extensor hallucis longus and extensor digitorum tendons are unremarkable. Flexor hallucis longus demonstrates mild fluid about the master knot of Dylan. The distal tendon is unremarkable. Remainder the visualized musculature about the foot demonstrates minimal edema within the plantar interossei muscle about the fourth/fifth metatarsal bases visualized portions plantar fascia are unremarkable. First MTP joint demonstrates physiologic amount of fluid. No joint effusion. No  periarticular marrow edema demonstrated. No osteoarthritic degenerative change. Sesamoid bones first MTP joint demonstrate mild intermediate signal intensity in the fibular sesamoid outer margin. Intersesamoid ligament and visualized plantar plate are intact. Second through fifth MTP joints are unremarkable. No joint effusions or periarticular marrow edema. No intermetatarsal bursitis. No soft tissue fullness plantar margin metatarsal hasn't distribution the digital nerve to suggest neuroma formation. Mild subcutaneous edema dorsum of foot.     IMPRESSION LEFT FOOT MRI 04/21/2021:  1. Mild tenosynovitis about the flexor digitorum longus tendon within the midfoot deep to the level skin marker. No tendinitis or focal tendon tear.  2. Minimal stress reaction fourth metatarsal base with mild adjacent pericapsular edema. However, no capsulitis demonstrated. Minimal edema and strain within the adjacent plantar interossei muscle.  3. Minimal edema within the plantar interossei muscle about the fourth/fifth metatarsal bases  4. Mild sesmoiditis     This report has been produced using speech recognition.  Original Interpreting Physician: ARYA CURRIE MD  Original Transcribed by/Date: [facility] Apr 21 2021 8:17A  ----------------------------------------------------  PROCEDURE: FOOT LT MIN 3 VIEW - TXR [id number]  REASON FOR EXAM: SPRAIN OF OTHER LIGAMENT OF LEFT ANKLE,SUBSEQUENT ENCOUNTER  RESULT: EXAM: Left foot three views weightbearing  CLINICAL HISTORY: Lateral pain from injury one year ago.                    FINDINGS: No fractures or destructive lesions are identified.                    IMPRESSION LEFT FOOT XR 04/14/2021:  No acute pathologic findings are identified.                    This report has been produced using speech recognition.  Original Interpreting Physician: NATHAN WRIGHT M.D.  Original Transcribed by/Date: [facility] Apr 14 2021 2:20P  ----------------------------------------------------  PROCEDURE: ANKLE LT WO -  BMR [id number]  REASON FOR EXAM: PAIN IN LEFT ANKLE  RESULT: ANKLE LT WO: 2/3/2021 5:09 PM  CLINICAL HISTORY: Ankle pain sprain ankle instability  COMPARISON: 12/8/2020                    TECHNIQUE: Multiplanar and multisequence MR images were obtained of the left ankle.                    FINDINGS:  Anterior tibiofibular ligament is unremarkable. Posterior tibiofibular ligament is unremarkable. Distal interosseous tibiofibular ligament is unremarkable.                    Anterior talofibular ligament is unremarkable. No reactive marrow edema. No cortical avulsion fragment. Calcaneofibular ligament demonstrates periligamentous edema about the tip the lateral malleolus with sequela of low-grade sprain suggested. No cortical avulsion fragment noted. Posterior talofibular ligament is unremarkable.                    Posterior tibiotalar portion deep deltoid ligament demonstrates loss of normal striated configuration the ligament with sequela chronic low-grade sprain. No periligamentous edema demonstrated. Visualized components superficial deltoid ligament are unremarkable. Spring ligament complex is unremarkable.                    Sinus Tarsi is unremarkable. Subtalar joint is unremarkable. Ankle joint is unremarkable. There is no effusion. No cartilage defect demonstrated.                     Posterior tibial tendon demonstrates mild physiologic fluid about the tendon sheath in the groove the medial malleolus. No surrounding soft tissue edema, but mild tenosynovitis not excluded. Also, mild fluid within the flexor digitorum and flexor hallucis longus tendon sheaths extending from the master knot of Dylan proximally which is physiologic. No surrounding edema. Tarsal tunnel demonstrates unremarkable medial lateral plantar nerves.                    Peroneus brevis tendon tendon is unremarkable. Tendon is intact to the base the fifth metatarsal. No focal tendon tear. Peroneus longus tendon demonstrates mild tendinosis with  low-grade partial superficial tear about the peroneal tubercle with slight distal extension. Superimpose component of mild tendinitis in this area of concern. No definite split tear                    Tibialis anterior and extensor tendons are unremarkable.                    Achilles tendon demonstrates mild tendinosis distally with mild intermediate signal intensity. No tendinitis or bursitis. Plantar fascia demonstrates no thickened central or lateral cords. Minimal edema in the calcaneal heel pad. Musculature and hindfoot demonstrates no asymmetric atrophy or edema. Midfoot osseous structures demonstrate normal marrow signal intensity. Included portions visualized metatarsal shafts are unremarkable.                    IMPRESSION LEFT ANKLE MRI 02/04/2021:  1. Mild peroneus longus tendinosis distally with low-grade partial superficial tear about the peroneal tubercle extending to near the cuboid groove over a distance of 1.8 cm. Superimposed component of tendinitis in this area as not excluded with characterization limited magic angle artifact. No tenosynovitis demonstrated. Correlate point tenderness. No split tear noted.  2. Low-grade sprain of the calcaneofibular ligament about the fibular attachment  3. Physiologic amount of fluid about the flexor tendons with component of mild tenosynovitis about the posterior tibial felt to be less likely.  4. Chronic low-grade sprain of the deep deltoid ligament.                    This report has been produced using speech recognition.  Original Interpreting Physician: ARYA CURRIE MD  Original Transcribed by/Date: [facility] Feb 4 2021 9:08A  ----------------------------------------------------  PROCEDURE: ANKLE LT MIN 3 VIEW - TXR [id number]  REASON FOR EXAM: LEFT ANKLE PAIN  RESULT: ANKLE LT MIN 3 VIEW: 12/8/2020 9:17 AM  CLINICAL HISTORY: Left ankle pain inversion injury  COMPARISON: None.                    TECHNIQUE: Four views of the Left ankle were performed.                     FINDINGS:               Ankle mortise is intact. No fracture or dislocation. No ankle joint effusion identified. No osteoarthritic degenerative change. No osteochondral lesion within the talar dome.There is dorsal osteophytosis at the talonavicular articulation with sequela remote capsular sprain suggested. Minimal calcaneal spurring plantar fascia origin.                    IMPRESSION LEFT ANKLE XR12/08/2020:  1. Unremarkable left ankle joint. No osteochondral lesion or ankle joint effusion.  2. Minimal calcaneal spurring plantar fascia origin.                  This report has been produced using speech recognition.  Original Interpreting Physician: ARYA CURRIE MD  [facility]Date[facility] 9:42A        Function Tests:  Grifka Test: Negative.   Strunsky Test: Negative.   Toe Displacement Test: Negative.   Crepitation Test: Negative.   Metatarsal Tap Test: Negative.   Drake Compression Test (Calf Compression Test): Negative.   Achilles Tendon Tap Test: Negative.   Collateral and Syndesmosis Ligaments: Negative.   Talar Tilt Test 2(Eversion Stress Test/Valgus Stress Test: Negative.   Anterior Drawer Test: Negative.   Squeeze Test: Negative.   Dorsiflexion Test: Negative.   Plantarflexion Test: Negative.   Heel Thump Test: Negative.   Posterior Ankle Impingement Test-Hyperplantar Flexion Test: Negative.   Anterior Ankle Impingement Test-Hyperdorsiflexion Test: Negative.   Antwon Click Test (Lake Test): Negative.   Heel Compression Test: Negative.   Test for Rigid or Supple Flat Feet: Negative.   Tibial Torsion Test: Negative.   Tonya's Sign: Negative.   Sitting Gastrocnemius Test: Negative.   Sitting Soleus Test: Negative.   Lying Gastrocnemius Test: Negative.   Lying Soleus Test: Negative.   Standing/Walking Test: Negative.   Horizontal Squeeze Test:  Negative.   Vertical Squeeze Test:  Negative.   Hop Test: Negative.       Assessment   1. Left foot pain        2. Left peroneal  tendinosis        3. Tenosynovitis of left foot        4. Stress reaction of left foot with routine healing, subsequent encounter        5. Sesamoiditis of left foot        6. Edema of left foot        7. Lake's neuroma of left foot        8. Metatarsalgia of left foot        9. Acute left ankle pain        10. Sprain of other ligament of left ankle, subsequent encounter        11. Tenosynovitis of left ankle        12. High ankle sprain of left lower extremity, subsequent encounter        13. Left ankle instability        14. Gouty arthritis of left ankle        15. Leg length discrepancy        16. Eversion deformity of left foot            Treatment or Intervention:  Once again, May continue to use PRICE therapy as needed   Once again, Patient told to continue to Wear Tall XP Walking Boot their LEFT ankle injury/condition which he has at home and then after his surgery he will wean out of the walking boot an hour each day into one of his ankle braces  Start into physical Therapy 1-2 times a week for 8-10 weeks with manual therapy as well as dry needling and IASTM until complete   Once again, Reviewed home exercises to be performed by the patient routinely, including stretching exercises, Reviewed ice bucket icing and exercises to be performed while icing a minimum of twice daily  Once again, Stressed the importance of wearing shoes with good stability control to help with the biomechanics affecting the lower legs  Once again Stressed the importance of wearing full foot insoles to help with the biomechanics affecting the lower legs   Once again Recommendation to start taking again over-the-counter calcium with vitamin-D 2 -3000+ milligrams a day, as well as OTC symphytum as directed daily to promote bony healing, in addition to a daily multivitamin.   Once again Recommendation over-the-counter curcumin, turmeric, boswellia, as well as egg shell membrane as directed to aid with joint inflammation.    Recommendation to continue over-the-counter Move Free for joint health.,  Once again May take OTC Tylenol Extra Strength or OTC Tylenol Arthritis, taking one every 6-8 hours with food as needed for pain management.,  Patient advised regarding the risks and/or potential adverse reactions and/or side effects of any prescribed medications along with any over-the-counter medications or any supplements used. Patient advised to seek immediate medical care if any adverse reactions occur. The patient and/or patient(s) parent(s) verbalized their understanding.   Once again Discussed in detail with the patient to the level of their understanding the possibility in the future of LEFT ankle/foot MRI to rule out ligament or tendon injury versus stress fracture versus other   Discussed in detail with the patient to the level of their understanding the possibility in the future of regenerative injections versus corticosteroid injections   Once again continue to wear boot and once starting to feel better. Begin alternating out of the boot with the ankle brace.   Once again Recommended to wear compression socks to reduce swelling.   10 DAY TAPER:  The following prescription was electronically sent to the patient's pharmacy of record: Prednisone 20mg, take 4 tablets a day x 4 days, 3 tabletss a day x 3 days, 2 tablets a day x 2 days, and 1 tablet a day x 1 day with food; start tomorrow if received Solu-Medrol injection in the office at visit, otherwise start immediately, dispense quantity sufficient, with no refills. Patient advised regarding the risks and/or potential adverse reactions and/or side effects of any prescribed medications along with any over-the-counter medications or any supplements used. Patient advised to seek immediate medical care if any adverse reactions occur. The patient and/or patient(s) parent(s) verbalized their understanding  Patient received IM injection of SoluMedrol 125 milligrams at the time of this  office visit. The patient tolerated the injection well and without any adverse effects as observed, verified s/p 15 minutes.   Follow up in December or sooner if needed.       Diagnostic studies:  No additional imaging or laboratory studies are needed at this time.    Activity Instructions, Restrictions, and Accommodations:  The family has been provided a note (after visit summary) outlining all current activity instructions, restrictions, and accommodations.    Consultations/Referrals:  Physical therapy    Follow-up   Follow up in December or sooner if needed. Alise CASTANO, attest this documentation has been prepared under the direction and in the presence of Rosangela Crowe D.O. By signing below, Enoch CASTANO D.O, personally performed the services described in this documentation. All medical record entries made by the scribe were at my direction and in my presence. I have reviewed the chart and agree that the record reflects my personal performance and is accurate and complete. Please note that this report has been partially produced using speech recognition software. It may contain errors related to grammar, punctuation or spelling. Electronically signed, but not reviewed. Enoch Crowe D.O. Director of Sports Medicine.     ALISE CHIRINOS on 11/9/23 at 10:16 AM.     Enoch Crowe DO, FAOASM

## 2023-11-08 ENCOUNTER — APPOINTMENT (OUTPATIENT)
Dept: SPORTS MEDICINE | Facility: CLINIC | Age: 63
End: 2023-11-08
Payer: MEDICARE

## 2023-11-09 ENCOUNTER — OFFICE VISIT (OUTPATIENT)
Dept: SPORTS MEDICINE | Facility: CLINIC | Age: 63
End: 2023-11-09
Payer: MEDICARE

## 2023-11-09 VITALS
HEART RATE: 84 BPM | BODY MASS INDEX: 25.06 KG/M2 | SYSTOLIC BLOOD PRESSURE: 92 MMHG | DIASTOLIC BLOOD PRESSURE: 64 MMHG | WEIGHT: 185 LBS | HEIGHT: 72 IN

## 2023-11-09 DIAGNOSIS — M10.9 GOUTY ARTHRITIS OF LEFT ANKLE: ICD-10-CM

## 2023-11-09 DIAGNOSIS — M65.9 TENOSYNOVITIS OF LEFT FOOT: ICD-10-CM

## 2023-11-09 DIAGNOSIS — M79.672 LEFT FOOT PAIN: ICD-10-CM

## 2023-11-09 DIAGNOSIS — M77.42 METATARSALGIA OF LEFT FOOT: ICD-10-CM

## 2023-11-09 DIAGNOSIS — M67.88 LEFT PERONEAL TENDINOSIS: ICD-10-CM

## 2023-11-09 DIAGNOSIS — M84.375D STRESS REACTION OF LEFT FOOT WITH ROUTINE HEALING, SUBSEQUENT ENCOUNTER: ICD-10-CM

## 2023-11-09 DIAGNOSIS — G57.62 MORTON'S NEUROMA OF LEFT FOOT: ICD-10-CM

## 2023-11-09 DIAGNOSIS — M25.572 ACUTE LEFT ANKLE PAIN: ICD-10-CM

## 2023-11-09 DIAGNOSIS — M21.70 LEG LENGTH DISCREPANCY: ICD-10-CM

## 2023-11-09 DIAGNOSIS — M25.872 SESAMOIDITIS OF LEFT FOOT: ICD-10-CM

## 2023-11-09 DIAGNOSIS — S93.492D HIGH ANKLE SPRAIN OF LEFT LOWER EXTREMITY, SUBSEQUENT ENCOUNTER: ICD-10-CM

## 2023-11-09 DIAGNOSIS — M65.9 TENOSYNOVITIS OF LEFT ANKLE: ICD-10-CM

## 2023-11-09 DIAGNOSIS — M21.072 EVERSION DEFORMITY OF LEFT FOOT: ICD-10-CM

## 2023-11-09 DIAGNOSIS — S93.492D SPRAIN OF OTHER LIGAMENT OF LEFT ANKLE, SUBSEQUENT ENCOUNTER: ICD-10-CM

## 2023-11-09 DIAGNOSIS — R60.0 EDEMA OF LEFT FOOT: ICD-10-CM

## 2023-11-09 DIAGNOSIS — M25.372 LEFT ANKLE INSTABILITY: ICD-10-CM

## 2023-11-09 PROCEDURE — 1036F TOBACCO NON-USER: CPT | Performed by: FAMILY MEDICINE

## 2023-11-09 PROCEDURE — 1125F AMNT PAIN NOTED PAIN PRSNT: CPT | Performed by: FAMILY MEDICINE

## 2023-11-09 PROCEDURE — 1160F RVW MEDS BY RX/DR IN RCRD: CPT | Performed by: FAMILY MEDICINE

## 2023-11-09 PROCEDURE — 99214 OFFICE O/P EST MOD 30 MIN: CPT | Performed by: FAMILY MEDICINE

## 2023-11-09 PROCEDURE — 2500000004 HC RX 250 GENERAL PHARMACY W/ HCPCS (ALT 636 FOR OP/ED): Performed by: FAMILY MEDICINE

## 2023-11-09 PROCEDURE — 1159F MED LIST DOCD IN RCRD: CPT | Performed by: FAMILY MEDICINE

## 2023-11-09 RX ORDER — PREDNISONE 20 MG/1
TABLET ORAL
Qty: 30 TABLET | Refills: 0 | Status: SHIPPED | OUTPATIENT
Start: 2023-11-09

## 2023-11-09 RX ADMIN — METHYLPREDNISOLONE SODIUM SUCCINATE 125 MG: 125 INJECTION, POWDER, FOR SOLUTION INTRAMUSCULAR; INTRAVENOUS at 10:51

## 2023-11-09 ASSESSMENT — PAIN DESCRIPTION - DESCRIPTORS: DESCRIPTORS: ACHING;SHARP

## 2023-11-09 ASSESSMENT — PAIN - FUNCTIONAL ASSESSMENT: PAIN_FUNCTIONAL_ASSESSMENT: 0-10

## 2023-11-09 ASSESSMENT — PAIN SCALES - GENERAL: PAINLEVEL_OUTOF10: 3

## 2023-11-09 NOTE — PATIENT INSTRUCTIONS
Once again, May continue to use PRICE therapy as needed   Once again, Patient told to Wear Tall XP Walking Boot their LEFT ankle injury/condition which she has at home  Start back into  Physical Therapy 1-2 times a week for 8-10 weeks with manual therapy as well as dry needling and IASTM until complete   Once again, Reviewed home exercises to be performed by the patient routinely, including stretching exercises, Reviewed ice bucket icing and exercises to be performed while icing a minimum of twice daily  Once again, Stressed the importance of wearing shoes with good stability control to help with the biomechanics affecting the lower legs  Once again Stressed the importance of wearing full foot insoles to help with the biomechanics affecting the lower legs   Once again Recommendation to start taking again over-the-counter calcium with vitamin-D 2 -3000+ milligrams a day, as well as OTC symphytum as directed daily to promote bony healing, in addition to a daily multivitamin.   Once again Recommendation over-the-counter curcumin, turmeric, boswellia, as well as egg shell membrane as directed to aid with joint inflammation.   Recommendation to continue over-the-counter Move Free for joint health.,  Once again May take OTC Tylenol Extra Strength or OTC Tylenol Arthritis, taking one every 6-8 hours with food as needed for pain management.,  Patient advised regarding the risks and/or potential adverse reactions and/or side effects of any prescribed medications along with any over-the-counter medications or any supplements used. Patient advised to seek immediate medical care if any adverse reactions occur. The patient and/or patient(s) parent(s) verbalized their understanding.   Once again Discussed in detail with the patient to the level of their understanding the possibility in the future of LEFT ankle to rule out ligament or tendon injury versus stress fracture versus other   Discussed in detail with the patient to the  level of their understanding the possibility in the future of regenerative injections versus corticosteroid injections   Once again continue to wear boot and once starting to feel better. Begin alternating out of the boot with the ankle brace.   Once again Recommended to wear compression socks to reduce swelling.   10 DAY TAPER:  The following prescription was electronically sent to the patient's pharmacy of record: Prednisone 20mg, take 4 tablets a day x 4 days, 3 tabletss a day x 3 days, 2 tablets a day x 2 days, and 1 tablet a day x 1 day with food; start tomorrow if received Solu-Medrol injection in the office at visit, otherwise start immediately, dispense quantity sufficient, with no refills   Patient received IM injection of SoluMedrol 125 milligrams at the time of this office visit. The patient tolerated the injection well and without any adverse effects as observed, verified s/p 15 minutes.     Follow up in December or sooner if needed.

## 2023-11-10 ENCOUNTER — EVALUATION (OUTPATIENT)
Dept: PHYSICAL THERAPY | Facility: CLINIC | Age: 63
End: 2023-11-10
Payer: MEDICARE

## 2023-11-10 DIAGNOSIS — M67.88 LEFT PERONEAL TENDINOSIS: ICD-10-CM

## 2023-11-10 DIAGNOSIS — R60.0 EDEMA OF LEFT FOOT: ICD-10-CM

## 2023-11-10 DIAGNOSIS — M25.872 SESAMOIDITIS OF LEFT FOOT: ICD-10-CM

## 2023-11-10 DIAGNOSIS — S93.492D SPRAIN OF OTHER LIGAMENT OF LEFT ANKLE, SUBSEQUENT ENCOUNTER: ICD-10-CM

## 2023-11-10 DIAGNOSIS — S93.492D HIGH ANKLE SPRAIN OF LEFT LOWER EXTREMITY, SUBSEQUENT ENCOUNTER: ICD-10-CM

## 2023-11-10 DIAGNOSIS — M21.70 LEG LENGTH DISCREPANCY: ICD-10-CM

## 2023-11-10 DIAGNOSIS — M77.42 METATARSALGIA OF LEFT FOOT: ICD-10-CM

## 2023-11-10 DIAGNOSIS — M10.9 GOUTY ARTHRITIS OF LEFT ANKLE: ICD-10-CM

## 2023-11-10 DIAGNOSIS — M79.672 LEFT FOOT PAIN: ICD-10-CM

## 2023-11-10 DIAGNOSIS — M25.572 ACUTE LEFT ANKLE PAIN: ICD-10-CM

## 2023-11-10 DIAGNOSIS — M84.375D STRESS REACTION OF LEFT FOOT WITH ROUTINE HEALING, SUBSEQUENT ENCOUNTER: ICD-10-CM

## 2023-11-10 DIAGNOSIS — M25.372 LEFT ANKLE INSTABILITY: ICD-10-CM

## 2023-11-10 DIAGNOSIS — G57.62 MORTON'S NEUROMA OF LEFT FOOT: ICD-10-CM

## 2023-11-10 DIAGNOSIS — M65.9 TENOSYNOVITIS OF LEFT ANKLE: ICD-10-CM

## 2023-11-10 DIAGNOSIS — M65.9 TENOSYNOVITIS OF LEFT FOOT: ICD-10-CM

## 2023-11-10 DIAGNOSIS — M21.072 EVERSION DEFORMITY OF LEFT FOOT: ICD-10-CM

## 2023-11-10 PROCEDURE — 97162 PT EVAL MOD COMPLEX 30 MIN: CPT | Mod: GP | Performed by: PHYSICAL THERAPIST

## 2023-11-10 ASSESSMENT — ENCOUNTER SYMPTOMS
DEPRESSION: 0
OCCASIONAL FEELINGS OF UNSTEADINESS: 0
LOSS OF SENSATION IN FEET: 0

## 2023-11-10 ASSESSMENT — PAIN DESCRIPTION - DESCRIPTORS: DESCRIPTORS: ACHING

## 2023-11-10 ASSESSMENT — PAIN SCALES - GENERAL: PAINLEVEL_OUTOF10: 2

## 2023-11-10 ASSESSMENT — PAIN - FUNCTIONAL ASSESSMENT: PAIN_FUNCTIONAL_ASSESSMENT: 0-10

## 2023-11-10 NOTE — Clinical Note
November 10, 2023     Patient: Jules Gill   YOB: 1960   Date of Visit: 11/10/2023       To Whom It May Concern:    It is my medical opinion that Jules Gill {Work release (duty restriction):05047}.    If you have any questions or concerns, please don't hesitate to call.         Sincerely,        Nora Salazar, PT    CC: No Recipients

## 2023-11-10 NOTE — PROGRESS NOTES
"Physical Therapy Evaluation    Patient Name: Jules Gill \"Karyna"  MRN: [id number]  Evaluation Date: 11/10/2023  Time Calculation  Start Time: 1200  Stop Time: 1240  Time Calculation (min): 40 min      Subjective   General:  General  Reason for Referral: left ankle pan, peroneal tendinitis  Referred By: Enoch Crowe  Past Medical History Relevant to Rehab: recnet diagnosis of prostate cancer    Patient reported hx of injury: Reoccurance of same problem a couple years ago.  Ankle pain flared up in late September.  Swelled and increased pain.  Walking on regular basis.  Had pain. Went to stationary bike and then had increased pain.  Went to urgent care  and through gout and was given script that did bring swelling down.  then saw Dr. Crowe was given injection and put on prednisone.  Now wearing boot almost all the time except at night and in shower;  when not in boot tries to wear lace up brace;  had been waking up at night;  in and out f shower can be pain and unsteady;      Surgery:   Upcoming surgery on 11/20/23 for prostectomy  secondary to prostate cancer    Precautions:  Precautions  STEADI Fall Risk Score (The score of 4 or more indicates an increased risk of falling): 0  Precautions Comment: wearing boot at all times except shower, sleep    Relevant PMH:  Recent diagnosis of prostate cancer    Red flags: no     Pain:  Pain Assessment: 0-10  Pain Score: 2 (can be sharp up to 6/10 when stood to put sweatpants)  Pain Descriptors: Aching  Pain at ankle and lateral malleolous;    Home Living:     [facility]  Home type: House  Stairs: Yes  Lives with: Spouse    Prior Function Per Pt/Caregiver Report:     Independent     Objective   Posture:   Wearing cam boot for walking  Range of Motion:     Ankle AROM L   Dorsiflexion 10 deg   Plantarflexion 45 deg   Eversion 10 deg   Inversion 30 deg      Strength:     Ankle MMT L   Dorsiflexion 4/5   Plantarflexion 4/5   Eversion 4-/5   Inversion 4/5    "   Flexibility:   Tight achilles/gastroc and TC joint  Palpation:   Tender ATFL and CFL; very sensitive tip of lateral malleolus -- slight swelling   Special Tests:   Figure 8 = 54.6cm  Gait:    Wearing aircast walking boot   Balance:   Wearing boot        Outcome Measures:  Other Measures  Lower Extremity Funtional Score (LEFS): 38/80     Assessment  PT Assessment Results: Decreased strength, Decreased range of motion, Impaired balance, Decreased mobility, Pain  Rehab Prognosis: Good  Evaluation/Treatment Tolerance: Patient tolerated treatment well    Pt is a 63 y.o. male who presents with impairments of reduced ROM, weakness, balance deficit and pain. These impairments have led to functional limitations including walking, standing, and activities in WB (ADLs..stepping out of shower). Pt would benefit from skilled physical therapy intervention to improve above impairments and facilitate return to function.    Plan  Treatment/Interventions: Dry needling, Education/ Instruction, Electrical stimulation, Laser, Manual therapy, Neuromuscular re-education, Taping techniques, Therapeutic activities, Therapeutic exercises, Ultrasound  PT Plan: Skilled PT  PT Frequency: 2 times per week  Duration: 10  sessions requested  Number of Treatments Authorized: await approval  Rehab Potential: Good    Plan for next visit: await approval:  beginwith ROM and light theraband,  dry needling to peroneal, ATFL, CFL,     OP EDUCATION:  Education  Individual(s) Educated: Patient  Education Provided: Anatomy, Home Exercise Program, POC  Risk and Benefits Discussed with Patient/Caregiver/Other: yes  Patient/Caregiver Demonstrated Understanding: yes  Plan of Care Discussed and Agreed Upon: yes  Patient Response to Education: Patient/Caregiver Verbalized Understanding of Information, Patient/Caregiver Performed Return Demonstration of Exercises/Activities    Today's Treatment:  Therapeutic Exercise (non billable)  HEP to be completed daily,  exercises include:  Ankle circles, pumps and toe towel crunches.  Advised patient on ways to reduce swelling ice bath, contrast bath,     Goals:  Active       PT Problem       PT STGs       Start:  11/10/23    Expected End:  12/25/23       1)  Pain will be 1-4/10 with walking without boot   2)  Increase ROM in ankle by 5 degrees in all planes to improve heel toe pattern during ambulation   3)  Patient will be knowledgeable with proper footwear and wean from boot;         PT LTGs       Start:  11/10/23    Expected End:  02/08/24       1)  Pain will be 0-2/10 with ambulating without boot while in yard or store  2) Increase LE strength to 4+/5 at least to improve ability to get in /out of shower without pain or instability  3)  Patient will be able to SLS for 20 sec dynamically without LOB   4)  Patient will be able to negotiate stairs without deviations  5)  Patient will be able to ambulate at least 1 mile without deviations or pain

## 2023-11-10 NOTE — Clinical Note
November 10, 2023     Patient: Jules Gill   YOB: 1960   Date of Visit: 11/10/2023       To Whom it May Concern:    Jules Gill was seen in my clinic on 11/10/2023. He {Return to school/sport:30344}.    If you have any questions or concerns, please don't hesitate to call.         Sincerely,          Nora Salazar, PT        CC: No Recipients

## 2023-12-12 ENCOUNTER — TREATMENT (OUTPATIENT)
Dept: PHYSICAL THERAPY | Facility: CLINIC | Age: 63
End: 2023-12-12
Payer: MEDICARE

## 2023-12-12 DIAGNOSIS — M67.88 LEFT PERONEAL TENDINOSIS: ICD-10-CM

## 2023-12-12 DIAGNOSIS — M79.672 LEFT FOOT PAIN: ICD-10-CM

## 2023-12-12 DIAGNOSIS — M65.9 TENOSYNOVITIS OF LEFT FOOT: ICD-10-CM

## 2023-12-12 DIAGNOSIS — M84.375D STRESS REACTION OF LEFT FOOT WITH ROUTINE HEALING, SUBSEQUENT ENCOUNTER: ICD-10-CM

## 2023-12-12 PROCEDURE — 97110 THERAPEUTIC EXERCISES: CPT | Mod: GP,CQ

## 2023-12-12 NOTE — PROGRESS NOTES
"Physical Therapy Treatment    Patient Name: Jules Gill  MRN: [id number]  Encounter date:  12/12/2023  Time Calculation  Start Time: 1646  Stop Time: 1729  Time Calculation (min): 43 min    Visit Number:  visit #2    Current Problem  1. Left foot pain  Follow Up In Physical Therapy      2. Left peroneal tendinosis  Follow Up In Physical Therapy      3. Tenosynovitis of left foot  Follow Up In Physical Therapy      4. Stress reaction of left foot with routine healing, subsequent encounter  Follow Up In Physical Therapy            Precautions: surgery of prostate cancer and removal on 11/20/23    Pain: 1/10    Subjective: Pt reports he will have more test in February to make sure that the cancer didn't return. Pt is dealing with the side effects of incontinence. Pt reports that he was \"laid up\" following surgery and wasn't walking much and notes that he was able to go without his boot and only the brace approximately x 1 week post op. Pt believes \"the time off my feet was helpful.\" Continues to notice deficits in his ROM.     Objective:  L ankle ROM 0-7 degrees dorsi and plantar flexion 36 degrees. Eversion 9 degrees and inversion 34 degrees.      Treatment:  ROM L ankle (AROM)  Ankle pumps pf/df 2 x15 reps  Inv/ev 2 x 15 HEP  Cw/ccw x 10 reps ea   [employer] UPPER CASE  HEP    GS stretch in long sit with green strap 3 x 20 sec holds HEP     Ankle PREs with bands 2 x 10  Plantar flexion OTB HEP  Dorsiflexion 2 x 10 (PTB)  Seated HR 2 x 10 HEP    Extra time spent on pt education on performance of HEP.     Billed Treatment Times  Therapeutic Exercise 43 min           Assessment: Pt left feeling better with little to no pain reported. Ankle and LE shaking with seated HR and with dorsiflexion with PTB. Weakness and limited ROM continue to impact pts functional ability.        Pain end of session:  0-1/10    Plan: Cont w/ POC and follow up on tolerance to HEP.      Continue with current POC/no changes    Assessment of " current progress against goals:  Progressing toward functional goals    Goals:  Active       PT Problem       PT STGs       Start:  11/10/23    Expected End:  12/25/23       1)  Pain will be 1-4/10 with walking without boot   2)  Increase ROM in ankle by 5 degrees in all planes to improve heel toe pattern during ambulation   3)  Patient will be knowledgeable with proper footwear and wean from boot;         PT LTGs       Start:  11/10/23    Expected End:  02/08/24       1)  Pain will be 0-2/10 with ambulating without boot while in yard or store  2) Increase LE strength to 4+/5 at least to improve ability to get in /out of shower without pain or instability  3)  Patient will be able to SLS for 20 sec dynamically without LOB   4)  Patient will be able to negotiate stairs without deviations  5)  Patient will be able to ambulate at least 1 mile without deviations or pain

## 2023-12-14 ENCOUNTER — TREATMENT (OUTPATIENT)
Dept: PHYSICAL THERAPY | Facility: CLINIC | Age: 63
End: 2023-12-14
Payer: MEDICARE

## 2023-12-14 DIAGNOSIS — M67.88 LEFT PERONEAL TENDINOSIS: ICD-10-CM

## 2023-12-14 DIAGNOSIS — M65.9 TENOSYNOVITIS OF LEFT FOOT: ICD-10-CM

## 2023-12-14 DIAGNOSIS — M79.672 LEFT FOOT PAIN: ICD-10-CM

## 2023-12-14 DIAGNOSIS — M84.375D STRESS REACTION OF LEFT FOOT WITH ROUTINE HEALING, SUBSEQUENT ENCOUNTER: ICD-10-CM

## 2023-12-14 PROCEDURE — 97110 THERAPEUTIC EXERCISES: CPT | Mod: GP | Performed by: PHYSICAL THERAPIST

## 2023-12-14 ASSESSMENT — PAIN SCALES - GENERAL: PAINLEVEL_OUTOF10: 1

## 2023-12-14 ASSESSMENT — PAIN DESCRIPTION - DESCRIPTORS: DESCRIPTORS: ACHING

## 2023-12-14 ASSESSMENT — PAIN - FUNCTIONAL ASSESSMENT: PAIN_FUNCTIONAL_ASSESSMENT: 0-10

## 2023-12-14 NOTE — PROGRESS NOTES
Physical Therapy Treatment    Patient Name: Jules Gill  MRN: [id number]  Encounter date:  12/14/2023  Time Calculation  Start Time: 1705  Stop Time: 1750  Time Calculation (min): 45 min    Visit Number:  2 / 8   Visit Authorized:  8 follow ups 11/15/23 to 1/13/24. [id number].     Current Problem  1. Left foot pain  Follow Up In Physical Therapy      2. Left peroneal tendinosis  Follow Up In Physical Therapy      3. Tenosynovitis of left foot  Follow Up In Physical Therapy      4. Stress reaction of left foot with routine healing, subsequent encounter  Follow Up In Physical Therapy          Precautions  Precautions  Precautions Comment: wearing tennis shoes now  Recent prostatectomy because of prostate cancer -- NO MODALITIES OR NEEDLING   Pain  Pain Assessment: 0-10  Pain Score: 1 (notices pain with uneven surfaces.)  Pain Descriptors: Aching  Has been off pain medication for 4 days.    Subjective  General  Reason for Referral: left ankle pan, peroneal tendinitis  Referred By: Enoch Crowe  Past Medical History Relevant to Rehab: recent diagnosis of prostate cancer     Feels pain in ankle is getting better but noticed that balance and weakness when on uneven surfaces    Objective  DF 8, PF 48    Treatment:  Therapeutic exercises:    In long ist:   Peach band theraband x 10 DF, PF, INV, EV  Standing:    Heel raises x 10   Baps L2 4 way x 10   Sideways ambulation 8' x 4   Backward walking 8' x 4   Gastroc stretch standing with moflex 10 sec x 3      Also stressed exhaling on effort  Billed Treatment Times:  Therapeutic Exercise 40 min    OP EDUCATION:  Outpatient Education  Individual(s) Educated: Patient  Education Provided: Anatomy, Home Exercise Program, POC  Risk and Benefits Discussed with Patient/Caregiver/Other: yes  Patient/Caregiver Demonstrated Understanding: yes  Plan of Care Discussed and Agreed Upon: yes  Patient Response to Education: Patient/Caregiver Verbalized Understanding of Information,  Patient/Caregiver Performed Return Demonstration of Exercises/Activities    Assessment:  PT Assessment  PT Assessment Results: Decreased strength, Decreased range of motion, Impaired balance, Decreased mobility, Pain  Rehab Prognosis: Good  Evaluation/Treatment Tolerance: Patient tolerated treatment well  Improving in ROM and pain.  Patient continues with weakness as noted by tremors during exercises with band.  Patient with numerous questions about healing for prostatectomy and may benefit form PFPT to address issues.  Patient was instructed to improve exhaling on effort to improve PF contraction.      Pain end of session:  1    Plan:  OP PT Plan  Treatment/Interventions: Education/ Instruction, Manual therapy, Neuromuscular re-education, Taping techniques, Therapeutic activities, Therapeutic exercises  PT Plan: Skilled PT  PT Frequency: 2 times per week  Duration: 10  sessions requested  Number of Treatments Authorized: 8 session approved only  Rehab Potential: Good  Continue with current POC with the following changes POC changes to NO MODALITIES OR NEEDLING because of cancer diagnosis.  See above for approved codes  Patient must contact urologist to obtain clearance to return to PT in order to progress to more standing exercises for stengthening and balance.    Assessment of current progress against goals:  Progressing toward functional goals    Goals:  Active       PT Problem       PT STGs       Start:  11/10/23    Expected End:  12/25/23       1)  Pain will be 1-4/10 with walking without boot   2)  Increase ROM in ankle by 5 degrees in all planes to improve heel toe pattern during ambulation   3)  Patient will be knowledgeable with proper footwear and wean from boot;         PT LTGs       Start:  11/10/23    Expected End:  02/08/24       1)  Pain will be 0-2/10 with ambulating without boot while in yard or store  2) Increase LE strength to 4+/5 at least to improve ability to get in /out of shower without pain  or instability  3)  Patient will be able to SLS for 20 sec dynamically without LOB   4)  Patient will be able to negotiate stairs without deviations  5)  Patient will be able to ambulate at least 1 mile without deviations or pain

## 2023-12-19 ENCOUNTER — APPOINTMENT (OUTPATIENT)
Dept: PHYSICAL THERAPY | Facility: CLINIC | Age: 63
End: 2023-12-19
Payer: MEDICARE

## 2023-12-21 ENCOUNTER — TREATMENT (OUTPATIENT)
Dept: PHYSICAL THERAPY | Facility: CLINIC | Age: 63
End: 2023-12-21
Payer: MEDICARE

## 2023-12-21 DIAGNOSIS — M84.375D STRESS REACTION OF LEFT FOOT WITH ROUTINE HEALING, SUBSEQUENT ENCOUNTER: ICD-10-CM

## 2023-12-21 DIAGNOSIS — M79.672 LEFT FOOT PAIN: ICD-10-CM

## 2023-12-21 DIAGNOSIS — M67.88 LEFT PERONEAL TENDINOSIS: ICD-10-CM

## 2023-12-21 DIAGNOSIS — M65.9 TENOSYNOVITIS OF LEFT FOOT: ICD-10-CM

## 2023-12-21 PROCEDURE — 97140 MANUAL THERAPY 1/> REGIONS: CPT | Mod: GP | Performed by: PHYSICAL THERAPIST

## 2023-12-21 PROCEDURE — 97110 THERAPEUTIC EXERCISES: CPT | Mod: GP | Performed by: PHYSICAL THERAPIST

## 2023-12-21 ASSESSMENT — PAIN - FUNCTIONAL ASSESSMENT: PAIN_FUNCTIONAL_ASSESSMENT: 0-10

## 2023-12-21 ASSESSMENT — PAIN SCALES - GENERAL: PAINLEVEL_OUTOF10: 0 - NO PAIN

## 2023-12-21 NOTE — PROGRESS NOTES
"Physical Therapy Treatment    Patient Name: Jules Gill  MRN: [id number]  Encounter date:  12/21/2023  Time Calculation  Start Time: 1700  Stop Time: 1740  Time Calculation (min): 40 min     PT Therapeutic Procedures Time Entry  Manual Therapy Time Entry: 10  Therapeutic Exercise Time Entry: 30    Visit Number:  4 (including evaluation)   Visit Authorized:  8 follow ups 11/15/23 to 1/13/24. [id number].      Current Problem  1. Left foot pain  Follow Up In Physical Therapy      2. Left peroneal tendinosis  Follow Up In Physical Therapy      3. Tenosynovitis of left foot  Follow Up In Physical Therapy      4. Stress reaction of left foot with routine healing, subsequent encounter  Follow Up In Physical Therapy          Precautions  Precautions  Precautions Comment: wearing tennis shoes now    Pain  Pain Assessment: 0-10  Pain Score: 0 - No pain    Subjective  General  Reason for Referral: left ankle pan, peroneal tendinitis  Referred By: Enoch Crowe  Past Medical History Relevant to Rehab: recent diagnosis of prostate cancer     No pain.  Feels like the rest from surgery has been beneficial.  Knee sometimes can hurt.      Objective  Mild swelling around lateral malleolus.      Treatment:  Therapeutic exercise:    Nustep 5 min L2  Standing heel raises x 10   Heel raises with Er x 10   Step ups x 10 bilateral forward 6\"  Sideways ambulation orange band thighs 10' x 4   Baps L2 4 way  Gastroc stretch moflex 15 sec x 3   Step and hold on airex forward x 10   Step and hold on airex lateral x 10   Square wobble board.. F/B taps x 10 and then balance 20 sec   S/S taps x 10 then balance 20 sec     Manual:    STM with deep prep to left ankle -- retrograde     OP EDUCATION:  Outpatient Education  Individual(s) Educated: Patient  Education Provided: Anatomy, Home Exercise Program, POC  Risk and Benefits Discussed with Patient/Caregiver/Other: yes  Patient/Caregiver Demonstrated Understanding: yes  Plan of Care " Discussed and Agreed Upon: yes  Patient Response to Education: Patient/Caregiver Verbalized Understanding of Information, Patient/Caregiver Performed Return Demonstration of Exercises/Activities    Assessment:  PT Assessment  PT Assessment Results: Decreased strength, Decreased range of motion, Impaired balance, Decreased mobility, Pain  Rehab Prognosis: Good  Pt's response to treatment:  Pain is reducing since evaluation.  Patient with good tolerance to increase in WB exercises this date.      Pain end of session:  0    Plan:  OP PT Plan  Treatment/Interventions: Education/ Instruction, Manual therapy, Neuromuscular re-education, Taping techniques, Therapeutic activities, Therapeutic exercises  PT Plan: Skilled PT  PT Frequency: 2 times per week  Duration: 10  sessions requested  Number of Treatments Authorized: 8 session approved only  Rehab Potential: Good  Continue with current POC/no changes    Assessment of current progress against goals:  Progressing toward functional goals    Goals:  Active       PT Problem       PT STGs (Met)       Start:  11/10/23    Expected End:  12/25/23    Resolved:  12/21/23    1)  Pain will be 1-4/10 with walking without boot   2)  Increase ROM in ankle by 5 degrees in all planes to improve heel toe pattern during ambulation   3)  Patient will be knowledgeable with proper footwear and wean from boot;      Goal Note       Patient has met all STGs at this time.                PT LTGs       Start:  11/10/23    Expected End:  02/08/24       1)  Pain will be 0-2/10 with ambulating without boot while in yard or store  2) Increase LE strength to 4+/5 at least to improve ability to get in /out of shower without pain or instability  3)  Patient will be able to SLS for 20 sec dynamically without LOB   4)  Patient will be able to negotiate stairs without deviations  5)  Patient will be able to ambulate at least 1 mile without deviations or pain

## 2023-12-26 ENCOUNTER — APPOINTMENT (OUTPATIENT)
Dept: PHYSICAL THERAPY | Facility: CLINIC | Age: 63
End: 2023-12-26
Payer: MEDICARE

## 2023-12-26 ENCOUNTER — DOCUMENTATION (OUTPATIENT)
Dept: PHYSICAL THERAPY | Facility: CLINIC | Age: 63
End: 2023-12-26
Payer: MEDICARE

## 2023-12-26 NOTE — PROGRESS NOTES
"Physical Therapy                 Therapy Communication Note    Patient Name: Jules DELEON Naelcierra \"Jeronimo\"  MRN: [id number]  Today's Date: 12/26/2023     Discipline: Physical Therapy    Missed Time: Cancel    "

## 2023-12-28 ENCOUNTER — APPOINTMENT (OUTPATIENT)
Dept: PHYSICAL THERAPY | Facility: CLINIC | Age: 63
End: 2023-12-28
Payer: MEDICARE

## 2024-01-02 ENCOUNTER — TREATMENT (OUTPATIENT)
Dept: PHYSICAL THERAPY | Facility: CLINIC | Age: 64
End: 2024-01-02
Payer: MEDICARE

## 2024-01-02 DIAGNOSIS — M84.375D STRESS REACTION OF LEFT FOOT WITH ROUTINE HEALING, SUBSEQUENT ENCOUNTER: ICD-10-CM

## 2024-01-02 DIAGNOSIS — M65.9 TENOSYNOVITIS OF LEFT FOOT: ICD-10-CM

## 2024-01-02 DIAGNOSIS — M79.672 LEFT FOOT PAIN: ICD-10-CM

## 2024-01-02 DIAGNOSIS — M67.88 LEFT PERONEAL TENDINOSIS: ICD-10-CM

## 2024-01-02 PROCEDURE — 97112 NEUROMUSCULAR REEDUCATION: CPT | Mod: GP,CQ

## 2024-01-02 PROCEDURE — 97110 THERAPEUTIC EXERCISES: CPT | Mod: GP,CQ | Performed by: PHYSICAL THERAPIST

## 2024-01-02 PROCEDURE — 97110 THERAPEUTIC EXERCISES: CPT | Mod: GP,CQ

## 2024-01-02 ASSESSMENT — PAIN - FUNCTIONAL ASSESSMENT: PAIN_FUNCTIONAL_ASSESSMENT: 0-10

## 2024-01-02 ASSESSMENT — PAIN SCALES - GENERAL: PAINLEVEL_OUTOF10: 0 - NO PAIN

## 2024-01-02 NOTE — PROGRESS NOTES
"Physical Therapy Treatment    Patient Name: Jules Gill  MRN: [id number]  Encounter date:  1/2/2024  Time Calculation  Start Time: 1650  Stop Time: 1730  Time Calculation (min): 40 min     PT Therapeutic Procedures Time Entry  Neuromuscular Re-Education Time Entry: 15  Therapeutic Exercise Time Entry: 25    Visit Number:  5 (including evaluation)   Visit Authorized:  8 follow ups 11/15/23 to 1/13/24. [id number], [id number].      Current Problem  1. Left foot pain  Follow Up In Physical Therapy      2. Left peroneal tendinosis  Follow Up In Physical Therapy      3. Tenosynovitis of left foot  Follow Up In Physical Therapy      4. Stress reaction of left foot with routine healing, subsequent encounter  Follow Up In Physical Therapy          Precautions  Precautions  Precautions Comment: wearing tennis shoes now    Pain  Pain Assessment: 0-10  Pain Score: 0 - No pain    Subjective  General        \"I am able to walk 1.5 miles for two days in a row but then I have to rest on the third day.\"     Objective  Assist from RLE with LLE SLS on BAPS and BUE on bar    Treatment:  -Nustep 5 min L2 - with UE   -Standing heel raises with eccentric focus x 15  -Toe raises on column in mini squat x 10   -Eccentric heel raises x 10   -Heel raises with Er x 10 - DNP  -Step ups 2 x 10 bilateral forward 6.5\"  -Step ups x 10 bilateral lateral 6.5    // bars   -Baps L2 4 way - LLE with assist from RLE  SLS 20\" x 2 ea Alt - PRN UE LLE  Narrow FANY EC with CS 20\" x 2   Sideways ambulation orange band thighs 10' x 4 - No UE   Gastroc stretch moflex 15 sec x 3   Step and hold on airex forward x 10     DNP 1/2/24  Step and hold on airex lateral x 10   Square wobble board.. F/B taps x 10 and then balance 20 sec   S/S taps x 10 then balance 20 sec   Manual:    STM with deep prep to left ankle -- retrograde -       Assessment:   Compensatory bilateral ankle movements noted with SLS exercises and neuro re-ed L>R.   Pt's response to treatment:  Good "       Pain end of session:  0    Plan:     Continue with current POC/no changes    Assessment of current progress against goals:  Progressing toward functional goals    Goals:  Active       PT Problem       PT STGs (Met)       Start:  11/10/23    Expected End:  12/25/23    Resolved:  12/21/23    1)  Pain will be 1-4/10 with walking without boot   2)  Increase ROM in ankle by 5 degrees in all planes to improve heel toe pattern during ambulation   3)  Patient will be knowledgeable with proper footwear and wean from boot;      Goal Note       Patient has met all STGs at this time.                PT LTGs       Start:  11/10/23    Expected End:  02/08/24       1)  Pain will be 0-2/10 with ambulating without boot while in yard or store  2) Increase LE strength to 4+/5 at least to improve ability to get in /out of shower without pain or instability  3)  Patient will be able to SLS for 20 sec dynamically without LOB   4)  Patient will be able to negotiate stairs without deviations  5)  Patient will be able to ambulate at least 1 mile without deviations or pain

## 2024-01-09 ENCOUNTER — TREATMENT (OUTPATIENT)
Dept: PHYSICAL THERAPY | Facility: CLINIC | Age: 64
End: 2024-01-09
Payer: MEDICARE

## 2024-01-09 DIAGNOSIS — M79.672 LEFT FOOT PAIN: ICD-10-CM

## 2024-01-09 DIAGNOSIS — M84.375D STRESS REACTION OF LEFT FOOT WITH ROUTINE HEALING, SUBSEQUENT ENCOUNTER: ICD-10-CM

## 2024-01-09 DIAGNOSIS — M65.9 TENOSYNOVITIS OF LEFT FOOT: ICD-10-CM

## 2024-01-09 DIAGNOSIS — M67.88 LEFT PERONEAL TENDINOSIS: ICD-10-CM

## 2024-01-09 PROCEDURE — 97110 THERAPEUTIC EXERCISES: CPT | Mod: GP | Performed by: PHYSICAL THERAPIST

## 2024-01-09 ASSESSMENT — PAIN SCALES - GENERAL: PAINLEVEL_OUTOF10: 0 - NO PAIN

## 2024-01-09 ASSESSMENT — PAIN - FUNCTIONAL ASSESSMENT: PAIN_FUNCTIONAL_ASSESSMENT: 0-10

## 2024-01-09 NOTE — PROGRESS NOTES
"Physical Therapy Treatment    Patient Name: Jules Gill  MRN: [id number]  Encounter date:  1/9/2024  Time Calculation  Start Time: 1703  Stop Time: 1745  Time Calculation (min): 42 min     PT Therapeutic Procedures Time Entry  Therapeutic Exercise Time Entry: 42    Visit Number:  6 (including evaluation)  Planned total visits: 8 approved sessions only  Visit Authorized:  8 thru 1/13/24 [id number]     Current Problem  1. Left foot pain  Follow Up In Physical Therapy      2. Left peroneal tendinosis  Follow Up In Physical Therapy      3. Tenosynovitis of left foot  Follow Up In Physical Therapy      4. Stress reaction of left foot with routine healing, subsequent encounter  Follow Up In Physical Therapy          Precautions  Precautions  Precautions Comment: recent prostatectomy    Pain  Pain Assessment: 0-10  Pain Score: 0 - No pain    Subjective  General  Reason for Referral: left ankle pan, peroneal tendinitis  Referred By: Enoch Crowe  Past Medical History Relevant to Rehab: recent diagnosis of prostate cancer     Feels most challenged when on uneven ground like grass.  More careful with stairs    Objective  12 DF, 50 PF, 35 INV, 15 Eversion;  5/5 DF/PF in NWB, 4/5 inversion, 4/5 eversion;  gait:  non significant, no deviations noted;  able to negotiate stairs reciprocally; SLS 10 sec max but unsteady.      Treatment:  Therapeutic exercise  Theraband inversion/eversion green x 10   Standing step ups 6\" x 10 bilaterally  Sideways ambulation with 10# sport cord x 5 bilaterally  [place] 10' x 4   Tandem ambulation on foam beam 6' x 4 UE support PRN  Sideways ambulation on foam beam 6' x 4 US support PRN  Step and hold on airex forward x 10, lateral x 10 with UE support PRN  Steamboats without band (SLS on Left) x 10 F/E, x 10 abd (UE needed)   Steamboats with green band around left LE (SLS Left) x 10 F/E, x 10 abd (UE needed)    Discussed riding bike or walking to avoid running.  Patient was advised " that running may increase pain     OP EDUCATION:  Outpatient Education  Individual(s) Educated: Patient  Education Provided: Anatomy, Home Exercise Program, POC  Risk and Benefits Discussed with Patient/Caregiver/Other: yes  Patient/Caregiver Demonstrated Understanding: yes  Plan of Care Discussed and Agreed Upon: yes  Patient Response to Education: Patient/Caregiver Verbalized Understanding of Information, Patient/Caregiver Performed Return Demonstration of Exercises/Activities    Assessment:  PT Assessment  PT Assessment Results: Decreased strength, Decreased range of motion, Impaired balance, Decreased mobility, Pain  Rehab Prognosis: Good  Evaluation/Treatment Tolerance: Patient tolerated treatment well  Pt's response to treatment:  Patient has achieved all goals except for Single limb stance.  Mostly challenged by balance.  Patient feels he is able to progress independently at home and will contact physician if needed or pain returns.  Discharge to [facility] end of session:  0    Plan:  OP PT Plan  Treatment/Interventions: Education/ Instruction, Manual therapy, Neuromuscular re-education, Taping techniques, Therapeutic activities, Therapeutic exercises  PT Plan: Skilled PT  PT Frequency: 2 times per week  Duration: 10  sessions requested  Number of Treatments Authorized: 8 session approved only  Rehab Potential: Good  Discharge    Assessment of current progress against goals:  Functionally independent, goals met:  Date met 1/9/24 and Progressing toward functional goals    Goals:  Resolved       PT Problem       PT STGs (Met)       Start:  11/10/23    Expected End:  12/25/23    Resolved:  12/21/23    1)  Pain will be 1-4/10 with walking without boot   2)  Increase ROM in ankle by 5 degrees in all planes to improve heel toe pattern during ambulation   3)  Patient will be knowledgeable with proper footwear and wean from boot;      Goal Note       Patient has met all STGs at this time.                PT LTGs  (Met)       Start:  11/10/23    Expected End:  02/08/24    Resolved:  01/09/24    1)  Pain will be 0-2/10 with ambulating without boot while in yard or store  2) Increase LE strength to 4+/5 at least to improve ability to get in /out of shower without pain or instability  3)  Patient will be able to SLS for 20 sec dynamically without LOB   4)  Patient will be able to negotiate stairs without deviations  5)  Patient will be able to ambulate at least 1 mile without deviations or pain      Goal Note       Continues to need to work on balance -- partially achieved SLS goal.  All other goals achieved.

## 2024-01-19 ENCOUNTER — DOCUMENTATION (OUTPATIENT)
Dept: PHYSICAL THERAPY | Facility: CLINIC | Age: 64
End: 2024-01-19
Payer: MEDICARE

## 2024-01-19 NOTE — PROGRESS NOTES
"Physical Therapy    Discharge Summary    Name: Jules Gill \"Karyna"  MRN: [id number]  : 1960  Date: 24    Discharge Summary: PT    Discharge Information: Date of discharge 24, Date of last visit 24, Date of evaluation 11/10/23, Number of attended visits 6,     Therapy Summary: Patient has achieved all goals except for Single limb stance.  Mostly challenged by balance.  Patient feels he is able to progress independently at home and will contact physician if needed or pain returns.  Discharge to [facility]       Discharge Status: achieved all goals.       Rehab Discharge Reason: Achieved all and/or the most significant goals(s)  "

## 2024-07-30 ENCOUNTER — APPOINTMENT (OUTPATIENT)
Dept: OPHTHALMOLOGY | Facility: CLINIC | Age: 64
End: 2024-07-30
Payer: MEDICARE

## 2024-07-30 ENCOUNTER — OFFICE VISIT (OUTPATIENT)
Dept: OPHTHALMOLOGY | Facility: CLINIC | Age: 64
End: 2024-07-30
Payer: MEDICARE

## 2024-07-30 DIAGNOSIS — H44.23 BILATERAL DEGENERATIVE PROGRESSIVE HIGH MYOPIA: ICD-10-CM

## 2024-07-30 DIAGNOSIS — H25.813 COMBINED FORMS OF AGE-RELATED CATARACT OF BOTH EYES: ICD-10-CM

## 2024-07-30 DIAGNOSIS — Z97.3 WEARS CONTACT LENSES: ICD-10-CM

## 2024-07-30 DIAGNOSIS — H35.89 OTHER SPECIFIED RETINAL DISORDERS: Primary | ICD-10-CM

## 2024-07-30 DIAGNOSIS — H52.31 ANISOMETROPIA: ICD-10-CM

## 2024-07-30 DIAGNOSIS — H43.393 VITREOUS FLOATERS OF BOTH EYES: ICD-10-CM

## 2024-07-30 PROBLEM — H52.7 REFRACTIVE ERROR: Status: ACTIVE | Noted: 2024-07-30

## 2024-07-30 PROCEDURE — 99214 OFFICE O/P EST MOD 30 MIN: CPT | Performed by: OPHTHALMOLOGY

## 2024-07-30 PROCEDURE — 92015 DETERMINE REFRACTIVE STATE: CPT | Performed by: OPHTHALMOLOGY

## 2024-07-30 PROCEDURE — 92134 CPTRZ OPH DX IMG PST SGM RTA: CPT | Performed by: OPHTHALMOLOGY

## 2024-07-30 PROCEDURE — FLVLF CONTACT LENS EVALUATION (SP): Performed by: OPHTHALMOLOGY

## 2024-07-30 RX ORDER — ACETAMINOPHEN, DEXTROMETHORPHAN HBR, DOXYLAMINE SUCCINATE, PHENYLEPHRINE HCL 650; 20; 12.5; 1 MG/30ML; MG/30ML; MG/30ML; MG/30ML
1000 SOLUTION ORAL
COMMUNITY
Start: 2024-06-03 | End: 2025-06-03

## 2024-07-30 RX ORDER — FOLIC ACID 1 MG/1
1 TABLET ORAL
COMMUNITY
Start: 2024-06-03 | End: 2025-06-03

## 2024-07-30 RX ORDER — TADALAFIL 5 MG/1
1 TABLET ORAL DAILY PRN
COMMUNITY
Start: 2024-03-14

## 2024-07-30 ASSESSMENT — TONOMETRY
OS_IOP_MMHG: 17
OD_IOP_MMHG: 17
IOP_METHOD: GOLDMANN APPLANATION

## 2024-07-30 ASSESSMENT — ENCOUNTER SYMPTOMS
CARDIOVASCULAR NEGATIVE: 0
RESPIRATORY NEGATIVE: 0
NEUROLOGICAL NEGATIVE: 0
GASTROINTESTINAL NEGATIVE: 0
ALLERGIC/IMMUNOLOGIC NEGATIVE: 0
EYES NEGATIVE: 0
MUSCULOSKELETAL NEGATIVE: 0
CONSTITUTIONAL NEGATIVE: 0
ENDOCRINE NEGATIVE: 0
PSYCHIATRIC NEGATIVE: 0
HEMATOLOGIC/LYMPHATIC NEGATIVE: 0

## 2024-07-30 ASSESSMENT — VISUAL ACUITY
CORRECTION_TYPE: CONTACTS
OS_PH_CC: 20/40
OD_CC+: +1
CORRECTION_TYPE: GLASSES
OD_BAT_HIGH: 20/40
OD_CC: 20/40
OD_CC: 20/30
OS_PH_CC+: -2
METHOD: SNELLEN - SINGLE
OS_BAT_HIGH: 20/50
METHOD: SNELLEN - LINEAR
OS_CC: 20/40
OS_CC: 20/50
OS_CC+: +1
OD_CC+: -1

## 2024-07-30 ASSESSMENT — KERATOMETRY
OS_AXISANGLE2_DEGREES: 180
METHOD_AUTO_MANUAL: AUTOMATED
OD_K2POWER_DIOPTERS: 44.75
OS_K2POWER_DIOPTERS: 45.00
OS_AXISANGLE_DEGREES: 90
OD_AXISANGLE_DEGREES: 175
OD_AXISANGLE2_DEGREES: 85
OS_K1POWER_DIOPTERS: 45.00
OD_K1POWER_DIOPTERS: 44.25

## 2024-07-30 ASSESSMENT — REFRACTION_MANIFEST
OS_SPHERE: -14.00
OD_CYLINDER: -1.25
OS_AXIS: 085
OD_SPHERE: -11.50
OS_CYLINDER: -2.00
OD_AXIS: 085
METHOD_AUTOREFRACTION: 1

## 2024-07-30 ASSESSMENT — REFRACTION_WEARINGRX
OD_CYLINDER: -0.50
OD_AXIS: 090
OS_ADD: +2.50
OD_ADD: +2.50
OS_CYLINDER: -1.25
OD_SPHERE: -12.50
OS_AXIS: 032
OS_SPHERE: -14.25

## 2024-07-30 ASSESSMENT — REFRACTION_CURRENTRX
OS_BRAND: PROCLEAR
OD_BASECURVE: 8.6
OS_DIAMETER: 14.2
OS_SPHERE: -12.50
OS_BASECURVE: 8.6
OD_DIAMETER: 14.2
OD_BRAND: PROCLEAR
OD_SPHERE: -10.50

## 2024-07-30 ASSESSMENT — SLIT LAMP EXAM - LIDS
COMMENTS: 1+ DERMATOCHALASIS - UPPER LID, 1+ BLEPHARITIS
COMMENTS: 1+ DERMATOCHALASIS - UPPER LID, 1+ BLEPHARITIS

## 2024-07-30 ASSESSMENT — CUP TO DISC RATIO
OD_RATIO: 0.35
OS_RATIO: 0.4

## 2024-07-30 ASSESSMENT — PATIENT HEALTH QUESTIONNAIRE - PHQ9
2. FEELING DOWN, DEPRESSED OR HOPELESS: NOT AT ALL
1. LITTLE INTEREST OR PLEASURE IN DOING THINGS: NOT AT ALL
SUM OF ALL RESPONSES TO PHQ9 QUESTIONS 1 AND 2: 0

## 2024-07-30 ASSESSMENT — PAIN SCALES - GENERAL: PAINLEVEL: 0-NO PAIN

## 2024-07-30 ASSESSMENT — EXTERNAL EXAM - RIGHT EYE: OD_EXAM: NORMAL

## 2024-07-30 ASSESSMENT — EXTERNAL EXAM - LEFT EYE: OS_EXAM: NORMAL

## 2024-07-30 NOTE — PROGRESS NOTES
Subjective   Patient ID: Jeronimo Gill is a 63 y.o. male.    Chief Complaint    Annual Exam       HPI    States vision fluctuates from day to day, some days are better than other. Feels as if new medication is affecting vision.    Complete, macular, and cl exam.  Now on Cialis for prostate.  Vision mostly unchanged.  Issues driving at night.  Wearing glasses more.    Last edited by Christopher Christopher MD on 7/30/2024  3:45 PM.        No current outpatient medications on file. (Ophthalmology pharm classes)       Current Outpatient Medications (Other)   Medication Sig Dispense Refill    cyanocobalamin, vitamin B-12, (Vitamin B-12) 1,000 mcg tablet extended release Take 1 tablet (1,000 mcg) by mouth once daily.      folic acid (Folvite) 1 mg tablet Take 1 tablet (1 mg) by mouth once daily.      tadalafil (Cialis) 5 mg tablet Take 1 tablet (5 mg) by mouth once daily as needed.      glucosam/chond/hyalu/CF borate (MOVE FREE JOINT HEALTH ORAL) Move Free Joint Health Advance      ibuprofen 200 mg tablet Take 1 tablet (200 mg) by mouth every 6 hours if needed for mild pain (1 - 3).      ibuprofen-famotidine (Duexis) 800-26.6 mg tablet per tablet Take 1 tablet by mouth 3 times a day.      predniSONE (Deltasone) 20 mg tablet Prednisone 20mg, take 4 tablets a day x 4 days, 3 tablets a day x 3 days, 2 tablets a day x 2 days, and 1 tablet a day x 1 day with food 30 tablet 0       Objective   Base Eye Exam       Visual Acuity (Snellen - Single)         Right Left Both    Dist cc 20/40 -1 20/50 +1     Dist ph cc  20/40 -2     Near cc   J2      Correction: Glasses              Visual Acuity #2 (Snellen - Linear)         Right Left Both    Dist cc 20/30 +1 20/40     Near cc   J1+      Correction: Contacts              Tonometry (Goldmann Applanation, 2:39 PM)         Right Left    Pressure 17 17              Pupils         Dark Shape React APD    Right 4 Round 1 None    Left 4 Round 1 None              Extraocular Movement          Right Left     Full Full              Dilation       Both eyes: 1% Tropic 2.5% Phen @ 2:39 PM                  Additional Tests       Keratometry (Automated)         K1 Axis K2 Axis    Right 44.25 85 44.75 175    Left 45.00 180 45.00 90              Glare Testing         High    Right 20/40    Left 20/50                  Slit Lamp and Fundus Exam       External Exam         Right Left    External Normal Normal              Slit Lamp Exam         Right Left    Lids/Lashes 1+ Dermatochalasis - upper lid, 1+ Blepharitis 1+ Dermatochalasis - upper lid, 1+ Blepharitis    Conjunctiva/Sclera normal bulbar and palepbral conjunctiva normal bulbar and palepbral conjunctiva    Cornea normal epi/stroma/endo and tear film normal epi/stroma/endo and tear film    Anterior Chamber Deep and quiet Deep and quiet    Iris iris normal iris normal    Lens 1+ nuclear sclerosis, Trace Cortical cataract 1+ nuclear sclerosis, 1+ Cortical cataract    Anterior Vitreous vitreous syneresis vitreous syneresis              Fundus Exam         Right Left    Disc scleral crescent disc scleral crescent disc    C/D Ratio 0.35 0.4    Macula myopic degeneration myopic degeneration    Vessels normal retinal vessels normal retinal vessels    Periphery peripheral retinal degeneration peripheral retinal degeneration                  Refraction       Wearing Rx         Sphere Cylinder Axis Add    Right -12.50 -0.50 090 +2.50    Left -14.25 -1.25 032 +2.50              Manifest Refraction (Auto)         Sphere Cylinder Axis    Right -11.50 -1.25 085    Left -14.00 -2.00 085      Pupillary Distance: 63              Final Rx         Sphere Cylinder [place] Dist VA Add Near VA    Right -11.00 -1.00 090 20/30 +2.75 J1+    Left -14.00 -1.50 090 20/40 +2.75 J1+      Type: progressive    Expiration Date: 7/30/2026    Pupillary Distance: 63                  Contact Lens Exam       Current Contact Lens Rx         Brand Base Curve Diameter Sphere    Right Proclear 8.6  14.2 -10.50    Left Proclear 8.6 14.2 -12.50              Keratometry (Automated)         K1 Axis K2 Axis    Right 44.25 85 44.75 175    Left 45.00 180 45.00 90                    Assessment/Plan   Problem List Items Addressed This Visit          Eye/Vision problems    Floaters in visual field    Combined forms of age-related cataract of both eyes     F/u one year full with oct mac and cl exam.  Rx given.          Degenerative progressive high myopia    Anisometropia    Wears contact lenses     Other Visit Diagnoses       Other specified retinal disorders    -  Primary    Relevant Orders    OCT, Retina - OU - Both Eyes (Completed)

## 2025-08-05 ENCOUNTER — APPOINTMENT (OUTPATIENT)
Dept: OPHTHALMOLOGY | Facility: CLINIC | Age: 65
End: 2025-08-05
Payer: MEDICARE